# Patient Record
Sex: FEMALE | Race: WHITE | Employment: OTHER | URBAN - METROPOLITAN AREA
[De-identification: names, ages, dates, MRNs, and addresses within clinical notes are randomized per-mention and may not be internally consistent; named-entity substitution may affect disease eponyms.]

---

## 2022-06-27 DIAGNOSIS — M17.11 PRIMARY OSTEOARTHRITIS OF RIGHT KNEE: Primary | ICD-10-CM

## 2022-07-05 ENCOUNTER — TELEPHONE (OUTPATIENT)
Dept: ORTHOPEDIC SURGERY | Age: 77
End: 2022-07-05

## 2022-07-05 NOTE — TELEPHONE ENCOUNTER
Called patient and let her know the date and time for her joint camp and let her know I will schedule other appointments as soon as I hear back from bipin.

## 2022-07-08 RX ORDER — ACETAMINOPHEN 500 MG
1000 TABLET ORAL ONCE
Status: CANCELLED | OUTPATIENT
Start: 2022-07-08 | End: 2022-07-08

## 2022-07-08 RX ORDER — SODIUM CHLORIDE 9 MG/ML
INJECTION, SOLUTION INTRAVENOUS PRN
Status: CANCELLED | OUTPATIENT
Start: 2022-07-08

## 2022-07-08 RX ORDER — SODIUM CHLORIDE 0.9 % (FLUSH) 0.9 %
5-40 SYRINGE (ML) INJECTION PRN
Status: CANCELLED | OUTPATIENT
Start: 2022-07-08

## 2022-07-08 RX ORDER — SODIUM CHLORIDE 0.9 % (FLUSH) 0.9 %
5-40 SYRINGE (ML) INJECTION EVERY 12 HOURS SCHEDULED
Status: CANCELLED | OUTPATIENT
Start: 2022-07-08

## 2022-07-11 ENCOUNTER — TELEPHONE (OUTPATIENT)
Dept: ORTHOPEDIC SURGERY | Age: 77
End: 2022-07-11

## 2022-07-11 DIAGNOSIS — M17.11 PRIMARY OSTEOARTHRITIS OF RIGHT KNEE: ICD-10-CM

## 2022-07-12 ENCOUNTER — HOSPITAL ENCOUNTER (OUTPATIENT)
Dept: SURGERY | Age: 77
Discharge: HOME | End: 2022-07-15
Payer: MEDICARE

## 2022-07-12 ENCOUNTER — OFFICE VISIT (OUTPATIENT)
Dept: ORTHOPEDIC SURGERY | Age: 77
End: 2022-07-12

## 2022-07-12 ENCOUNTER — HOSPITAL ENCOUNTER (OUTPATIENT)
Dept: REHABILITATION | Age: 77
Discharge: HOME | End: 2022-07-15
Payer: MEDICARE

## 2022-07-12 DIAGNOSIS — M17.11 ARTHRITIS OF KNEE, RIGHT: Primary | ICD-10-CM

## 2022-07-12 DIAGNOSIS — M17.11 PRIMARY OSTEOARTHRITIS OF RIGHT KNEE: Primary | ICD-10-CM

## 2022-07-12 LAB
ANION GAP SERPL CALC-SCNC: 5 MMOL/L (ref 7–16)
APTT PPP: 26 SEC (ref 24.1–35.1)
BACTERIA SPEC CULT: ABNORMAL
BUN SERPL-MCNC: 10 MG/DL (ref 8–23)
CALCIUM SERPL-MCNC: 9.5 MG/DL (ref 8.3–10.4)
CHLORIDE SERPL-SCNC: 101 MMOL/L (ref 98–107)
CO2 SERPL-SCNC: 31 MMOL/L (ref 21–32)
CREAT SERPL-MCNC: 0.5 MG/DL (ref 0.6–1)
EKG ATRIAL RATE: 74 BPM
EKG DIAGNOSIS: NORMAL
EKG P AXIS: 36 DEGREES
EKG P-R INTERVAL: 128 MS
EKG Q-T INTERVAL: 394 MS
EKG QRS DURATION: 62 MS
EKG QTC CALCULATION (BAZETT): 437 MS
EKG R AXIS: 50 DEGREES
EKG T AXIS: 25 DEGREES
EKG VENTRICULAR RATE: 74 BPM
ERYTHROCYTE [DISTWIDTH] IN BLOOD BY AUTOMATED COUNT: 13 % (ref 11.9–14.6)
EST. AVERAGE GLUCOSE BLD GHB EST-MCNC: 111 MG/DL
GLUCOSE SERPL-MCNC: 84 MG/DL (ref 65–100)
HBA1C MFR BLD: 5.5 % (ref 4.8–5.6)
HCT VFR BLD AUTO: 37.1 % (ref 35.8–46.3)
HGB BLD-MCNC: 12.2 G/DL (ref 11.7–15.4)
INR PPP: 1
MCH RBC QN AUTO: 28.6 PG (ref 26.1–32.9)
MCHC RBC AUTO-ENTMCNC: 32.9 G/DL (ref 31.4–35)
MCV RBC AUTO: 87.1 FL (ref 79.6–97.8)
NRBC # BLD: 0 K/UL (ref 0–0.2)
PLATELET # BLD AUTO: 298 K/UL (ref 150–450)
PMV BLD AUTO: 9.9 FL (ref 9.4–12.3)
POTASSIUM SERPL-SCNC: 4 MMOL/L (ref 3.5–5.1)
PROTHROMBIN TIME: 13.8 SEC (ref 12.6–14.5)
RBC # BLD AUTO: 4.26 M/UL (ref 4.05–5.2)
SERVICE CMNT-IMP: ABNORMAL
SODIUM SERPL-SCNC: 137 MMOL/L (ref 136–145)
WBC # BLD AUTO: 4.6 K/UL (ref 4.3–11.1)

## 2022-07-12 PROCEDURE — 85730 THROMBOPLASTIN TIME PARTIAL: CPT

## 2022-07-12 PROCEDURE — 94760 N-INVAS EAR/PLS OXIMETRY 1: CPT

## 2022-07-12 PROCEDURE — 98960 EDU&TRN PT SELF-MGMT NQHP 1: CPT

## 2022-07-12 PROCEDURE — 80048 BASIC METABOLIC PNL TOTAL CA: CPT

## 2022-07-12 PROCEDURE — 85027 COMPLETE CBC AUTOMATED: CPT

## 2022-07-12 PROCEDURE — 83036 HEMOGLOBIN GLYCOSYLATED A1C: CPT

## 2022-07-12 PROCEDURE — 85610 PROTHROMBIN TIME: CPT

## 2022-07-12 PROCEDURE — 93005 ELECTROCARDIOGRAM TRACING: CPT | Performed by: ORTHOPAEDIC SURGERY

## 2022-07-12 PROCEDURE — 97161 PT EVAL LOW COMPLEX 20 MIN: CPT

## 2022-07-12 PROCEDURE — 87641 MR-STAPH DNA AMP PROBE: CPT

## 2022-07-12 RX ORDER — CALCIUM CARBONATE 500(1250)
500 TABLET ORAL DAILY
COMMUNITY

## 2022-07-12 RX ORDER — LISINOPRIL 2.5 MG/1
10 TABLET ORAL DAILY
COMMUNITY
End: 2022-07-15

## 2022-07-12 RX ORDER — LEVOTHYROXINE SODIUM 0.07 MG/1
75 TABLET ORAL DAILY
COMMUNITY

## 2022-07-12 RX ORDER — FERROUS SULFATE 137(45) MG
142 TABLET, EXTENDED RELEASE ORAL DAILY
COMMUNITY

## 2022-07-12 RX ORDER — OMEGA-3S/DHA/EPA/FISH OIL/D3 300MG-1000
400 CAPSULE ORAL DAILY
COMMUNITY

## 2022-07-12 ASSESSMENT — PAIN DESCRIPTION - DESCRIPTORS: DESCRIPTORS: ACHING

## 2022-07-12 ASSESSMENT — PAIN DESCRIPTION - LOCATION: LOCATION: KNEE

## 2022-07-12 ASSESSMENT — PAIN SCALES - GENERAL: PAINLEVEL_OUTOF10: 7

## 2022-07-12 ASSESSMENT — KOOS JR
HOW SEVERE IS YOUR KNEE STIFFNESS AFTER FIRST WAKING IN MORNING: 1
KOOS JR TOTAL INTERVAL SCORE: 84.6
TWISING OR PIVOTING ON KNEE: 0
BENDING TO THE FLOOR TO PICK UP OBJECT: 0
STANDING UPRIGHT: 0
RISING FROM SITTING: 0
GOING UP OR DOWN STAIRS: 1
STRAIGHTENING KNEE FULLY: 0

## 2022-07-12 ASSESSMENT — PAIN DESCRIPTION - ORIENTATION: ORIENTATION: RIGHT

## 2022-07-12 NOTE — PERIOP NOTE
Patient verified name and . Order for consent found in EHR and matches case posting; patient verified. Type 3 surgery, PAT Joint assessment complete. Labs per surgeon: CBC,BMP, PT/PTT, HgbA1C; results pending. Labs per anesthesia protocol: no additional lab work needed. EKG: Done today- within anesthesia guidelines. MRSA/MSSA swab collected; pharmacy to review and dose antibiotic as appropriate. Hospital approved surgical skin cleanser and instructions to return bottle on DOS given per hospital policy. Patient provided with handouts including Guide to Surgery, Pain Management, Hand Hygiene, Blood Transfusion Education, and [facility] Brochure. Patient answered medical/surgical history questions at their best of ability. All prior to admission medications documented in [facility]. Original medication prescription bottle not visualized during patient appointment. Patient instructed to hold all vitamins 3 weeks prior to surgery and NSAIDS 5 days prior to surgery. Patient teach back successful and patient demonstrates knowledge of instruction.

## 2022-07-12 NOTE — H&P (VIEW-ONLY)
[id number] [facility]  Pre Operative History and Physical Exam    Patient ID:  Anatoliy Squires  [id number]  48 y.o.  1945    Today: July 12, 2022           CC:  Right knee pain    HPI:   The patient has end stage arthritis of the right knee. The patient was evaluated and examined during a consultation prior to this office visit. There have been no changes to the patient's orthopedic condition since the initial consultation. The patient has failed previous conservative treatment for this condition including antiinflammatories , and lifestyle modifications. The necessity for joint replacement is present. The patient will be admitted the day of surgery for right knee. Past Medical/Surgical History:  Past Medical History:   Diagnosis Date    Arthritis     Hypertension     Managed with meds     Kidney stone      Past Surgical History:   Procedure Laterality Date    ESOPHAGOGASTRODUODENOSCOPY      JOINT REPLACEMENT Left 06/05/2012        Allergies:    Allergies   Allergen Reactions    Ciprofloxacin Other (See Comments)        Physical Exam:   General: NAD, Alert, Oriented, Appears their stated age     [de-identified]: NC/AT    Skin: No rashes, lesions or wounds seen      Psych: normal affect      Heart: Regular Rate, Rhythm     Lungs: unlabored respirations, no wheezing    Abdomen: Soft and non-distended     Ortho: Pain with limited ROM of the right knee    Neuro: no focal defects, moving extremities equally    Lymph: no lymphadenopathy     Meds:   Current Outpatient Medications   Medication Sig    lisinopril (PRINIVIL;ZESTRIL) 2.5 MG tablet Take 2.5 mg by mouth daily    levothyroxine (SYNTHROID) 75 MCG tablet Take 75 mcg by mouth Daily    ferrous sulfate (SLOW FE) 142 (45 Fe) MG extended release tablet Take 142 mg by mouth daily    calcium carbonate (OSCAL) 500 MG TABS tablet Take 500 mg by mouth daily    vitamin D3 (CHOLECALCIFEROL) 10 MCG (400 UNIT) TABS tablet Take 400 Units by mouth daily    Cholecalciferol (VITAMIN D3) 1.25 MG (25563 UT) TABS Take by mouth once a week    cyclobenzaprine (FLEXERIL) 10 MG tablet Take 10 mg by mouth 2 times daily as needed    naproxen (NAPROSYN) 500 MG tablet Take 500 mg by mouth 2 times daily as needed      No current facility-administered medications for this visit. Labs:  Hospital Outpatient Visit on 07/12/2022   Component Date Value Ref Range Status    WBC 07/12/2022 4.6  4.3 - 11.1 K/uL Final    RBC 07/12/2022 4.26  4.05 - 5.2 M/uL Final    Hemoglobin 07/12/2022 12.2  11.7 - 15.4 g/dL Final    Hematocrit 07/12/2022 37.1  35.8 - 46.3 % Final    MCV 07/12/2022 87.1  79.6 - 97.8 FL Final    MCH 07/12/2022 28.6  26.1 - 32.9 PG Final    MCHC 07/12/2022 32.9  31.4 - 35.0 g/dL Final    RDW 07/12/2022 13.0  11.9 - 14.6 % Final    Platelets 32/48/6511 298  150 - 450 K/uL Final    MPV 07/12/2022 9.9  9.4 - 12.3 FL Final    nRBC 07/12/2022 0.00  0.0 - 0.2 K/uL Final    **Note: Absolute NRBC parameter is now reported with Hemogram**    Sodium 07/12/2022 137  136 - 145 mmol/L Final    Potassium 07/12/2022 4.0  3.5 - 5.1 mmol/L Final    Chloride 07/12/2022 101  98 - 107 mmol/L Final    CO2 07/12/2022 31  21 - 32 mmol/L Final    Anion Gap 07/12/2022 5* 7 - 16 mmol/L Final    Glucose 07/12/2022 84  65 - 100 mg/dL Final    BUN 07/12/2022 10  8 - 23 MG/DL Final    CREATININE 07/12/2022 0.50* 0.6 - 1.0 MG/DL Final    GFR  07/12/2022 >60  >60 ml/min/1.73m2 Final    GFR Non- 07/12/2022 >60  >60 ml/min/1.73m2 Final    Comment:      Estimated GFR is calculated using the Modification of Diet in Renal Disease (MDRD) Study equation, reported for both  Americans (GFRAA) and non- Americans (GFRNA), and normalized to 1.73m2 body surface area. The physician must decide which value applies to the patient. The MDRD study equation should only be used in individuals age 25 or older.  It has not been validated for the following: pregnant women, patients with serious comorbid conditions,or on certain medications, or persons with extremes of body size, muscle mass, or nutritional status.  Calcium 07/12/2022 9.5  8.3 - 10.4 MG/DL Final    Protime 07/12/2022 13.8  12.6 - 14.5 sec Final    INR 07/12/2022 1.0    Final    Comment: Suggested therapeutic INR range:  Venous thrombosis and embolus  2.0-3.0  Prosthetic heart valve         2.5-3.5  ** Note new reference range and method **      PTT 07/12/2022 26.0  24.1 - 35.1 SEC Final    Comment: Heparin Therapeutic Range = 74 - 123 seconds  In addition to factor deficiency, monitoring heparin therapy, etc., evaluation of a prolonged aPTT result should include consideration of preanalytic variables such as heparin flush contamination, specimen integrity issues, etc.      Ventricular Rate 07/12/2022 74  BPM Final    Atrial Rate 07/12/2022 74  BPM Final    P-R Interval 07/12/2022 128  ms Final    QRS Duration 07/12/2022 62  ms Final    Q-T Interval 07/12/2022 394  ms Final    QTc Calculation (Bazett) 07/12/2022 437  ms Final    P Axis 07/12/2022 36  degrees Final    R Axis 07/12/2022 50  degrees Final    T Axis 07/12/2022 25  degrees Final    Diagnosis 07/12/2022 Normal sinus rhythm   Final                 Patient Active Problem List   Diagnosis    Arthritis of knee, right         Assessment:   1. Arthritis of the right knee      Plan:    1. Proceed with scheduled right knee    The patient was counseled at length about the risks of jerome Covid-19 during their perioperative period and any recovery window from their procedure. The patient was made aware that jerome Covid-19  may worsen their prognosis for recovering from their procedure and lend to a higher morbidity and/or mortality risk. All material risks, benefits, and reasonable alternatives including postponing the procedure were discussed. The patient does wish to proceed with the procedure at this time. Signed By: ART Alston  July 12, 2022

## 2022-07-12 NOTE — PROGRESS NOTES
[id number] [facility]  Pre Operative History and Physical Exam    Patient ID:  Beatriz Arita  [id number]  50 y.o.  1945    Today: July 12, 2022           CC:  Right knee pain    HPI:   The patient has end stage arthritis of the right knee. The patient was evaluated and examined during a consultation prior to this office visit. There have been no changes to the patient's orthopedic condition since the initial consultation. The patient has failed previous conservative treatment for this condition including antiinflammatories , and lifestyle modifications. The necessity for joint replacement is present. The patient will be admitted the day of surgery for right knee. Past Medical/Surgical History:  Past Medical History:   Diagnosis Date    Arthritis     Hypertension     Managed with meds     Kidney stone      Past Surgical History:   Procedure Laterality Date    ESOPHAGOGASTRODUODENOSCOPY      JOINT REPLACEMENT Left 06/05/2012        Allergies:    Allergies   Allergen Reactions    Ciprofloxacin Other (See Comments)        Physical Exam:   General: NAD, Alert, Oriented, Appears their stated age     [de-identified]: NC/AT    Skin: No rashes, lesions or wounds seen      Psych: normal affect      Heart: Regular Rate, Rhythm     Lungs: unlabored respirations, no wheezing    Abdomen: Soft and non-distended     Ortho: Pain with limited ROM of the right knee    Neuro: no focal defects, moving extremities equally    Lymph: no lymphadenopathy     Meds:   Current Outpatient Medications   Medication Sig    lisinopril (PRINIVIL;ZESTRIL) 2.5 MG tablet Take 2.5 mg by mouth daily    levothyroxine (SYNTHROID) 75 MCG tablet Take 75 mcg by mouth Daily    ferrous sulfate (SLOW FE) 142 (45 Fe) MG extended release tablet Take 142 mg by mouth daily    calcium carbonate (OSCAL) 500 MG TABS tablet Take 500 mg by mouth daily    vitamin D3 (CHOLECALCIFEROL) 10 MCG (400 UNIT) TABS tablet Take 400 Units by mouth daily    Cholecalciferol (VITAMIN D3) 1.25 MG (79066 UT) TABS Take by mouth once a week    cyclobenzaprine (FLEXERIL) 10 MG tablet Take 10 mg by mouth 2 times daily as needed    naproxen (NAPROSYN) 500 MG tablet Take 500 mg by mouth 2 times daily as needed      No current facility-administered medications for this visit. Labs:  Hospital Outpatient Visit on 07/12/2022   Component Date Value Ref Range Status    WBC 07/12/2022 4.6  4.3 - 11.1 K/uL Final    RBC 07/12/2022 4.26  4.05 - 5.2 M/uL Final    Hemoglobin 07/12/2022 12.2  11.7 - 15.4 g/dL Final    Hematocrit 07/12/2022 37.1  35.8 - 46.3 % Final    MCV 07/12/2022 87.1  79.6 - 97.8 FL Final    MCH 07/12/2022 28.6  26.1 - 32.9 PG Final    MCHC 07/12/2022 32.9  31.4 - 35.0 g/dL Final    RDW 07/12/2022 13.0  11.9 - 14.6 % Final    Platelets 06/97/8469 298  150 - 450 K/uL Final    MPV 07/12/2022 9.9  9.4 - 12.3 FL Final    nRBC 07/12/2022 0.00  0.0 - 0.2 K/uL Final    **Note: Absolute NRBC parameter is now reported with Hemogram**    Sodium 07/12/2022 137  136 - 145 mmol/L Final    Potassium 07/12/2022 4.0  3.5 - 5.1 mmol/L Final    Chloride 07/12/2022 101  98 - 107 mmol/L Final    CO2 07/12/2022 31  21 - 32 mmol/L Final    Anion Gap 07/12/2022 5* 7 - 16 mmol/L Final    Glucose 07/12/2022 84  65 - 100 mg/dL Final    BUN 07/12/2022 10  8 - 23 MG/DL Final    CREATININE 07/12/2022 0.50* 0.6 - 1.0 MG/DL Final    GFR  07/12/2022 >60  >60 ml/min/1.73m2 Final    GFR Non- 07/12/2022 >60  >60 ml/min/1.73m2 Final    Comment:      Estimated GFR is calculated using the Modification of Diet in Renal Disease (MDRD) Study equation, reported for both  Americans (GFRAA) and non- Americans (GFRNA), and normalized to 1.73m2 body surface area. The physician must decide which value applies to the patient. The MDRD study equation should only be used in individuals age 25 or older.  It has not been validated for the following: pregnant women, patients with serious comorbid conditions,or on certain medications, or persons with extremes of body size, muscle mass, or nutritional status.  Calcium 07/12/2022 9.5  8.3 - 10.4 MG/DL Final    Protime 07/12/2022 13.8  12.6 - 14.5 sec Final    INR 07/12/2022 1.0    Final    Comment: Suggested therapeutic INR range:  Venous thrombosis and embolus  2.0-3.0  Prosthetic heart valve         2.5-3.5  ** Note new reference range and method **      PTT 07/12/2022 26.0  24.1 - 35.1 SEC Final    Comment: Heparin Therapeutic Range = 74 - 123 seconds  In addition to factor deficiency, monitoring heparin therapy, etc., evaluation of a prolonged aPTT result should include consideration of preanalytic variables such as heparin flush contamination, specimen integrity issues, etc.      Ventricular Rate 07/12/2022 74  BPM Final    Atrial Rate 07/12/2022 74  BPM Final    P-R Interval 07/12/2022 128  ms Final    QRS Duration 07/12/2022 62  ms Final    Q-T Interval 07/12/2022 394  ms Final    QTc Calculation (Bazett) 07/12/2022 437  ms Final    P Axis 07/12/2022 36  degrees Final    R Axis 07/12/2022 50  degrees Final    T Axis 07/12/2022 25  degrees Final    Diagnosis 07/12/2022 Normal sinus rhythm   Final                 Patient Active Problem List   Diagnosis    Arthritis of knee, right         Assessment:   1. Arthritis of the right knee      Plan:    1. Proceed with scheduled right knee    The patient was counseled at length about the risks of jerome Covid-19 during their perioperative period and any recovery window from their procedure. The patient was made aware that jerome Covid-19  may worsen their prognosis for recovering from their procedure and lend to a higher morbidity and/or mortality risk. All material risks, benefits, and reasonable alternatives including postponing the procedure were discussed. The patient does wish to proceed with the procedure at this time. Signed By: ART Mota  July 12, 2022

## 2022-07-12 NOTE — PERIOP NOTE
Lab results within anesthesia guidelines. HgbA1C remains pending; will have charge nurse follow up. Left voicemail with pt to return call and give correct lisinopril dosage.

## 2022-07-12 NOTE — PROGRESS NOTES
Dnona Carolyn  : 1945  Primary: [facility]  Secondary:  Joint Camp at [facility]  [address] , [place]  [phone]      Physical Therapy Prehab Evaluation Summary:2022   Time In/Out   PT Charge Capture  Episode     MEDICAL/REFERRING DIAGNOSIS: Unilateral primary osteoarthritis, right knee [M17.11]  REFERRING PHYSICIAN: Johnny Raya, *    ICD-10: Treatment Diagnosis:   · Pain in Right Knee (M25.561)  · Stiffness of Right Knee, Not elsewhere classified (M25.661)    DATE OF SURGERY: 22  Assessment:   COMMENTS:  She is here alone and is having a R TKA. She had a L TKA. She is going home with her spouse nad daughter';s assistance. She will need a RW prior to DC. PROBLEM LIST:   (Impacting functional limitations):  Ms. Ever Mcghee presents with the following lower extremity(s) problems:  1. Strength  2. Range of Motion  3. Home Exercise Program  4. Pain INTERVENTIONS PLANNED:   (Benefits and precautions of physical therapy have been discussed with the patient.)  1. Home Exercise Program  2. Educational Discussion       GOALS: (Goals have been discussed and agreed upon with patient.)  Discharge Goals: Time Frame: 1 Day  1. Patient will demonstrate independence with a home exercise program designed to increase strength, range of motion and pain control to minimize functional deficits and optimize patient for total joint replacement. Subjective:   Past Medical History/Comorbidities:   Ms. Ever Mcghee  has a past medical history of Arthritis, Hypertension, and Kidney stone. Ms. Ever Mcghee  has a past surgical history that includes Esophagogastroduodenoscopy and joint replacement (Left, 2012).     Allergies:  Ciprofloxacin    Current Medications:  Refer to pre-assessment nursing note    Home Set-Up/Prior Level of Function:  Lives With: Spouse,Family  Type of Home: House  Home Layout: One level  Home Access: Stairs to enter without rails  Entrance Stairs - Number of Steps: 2  Bathroom Shower/Tub: Walk-in shower  Bathroom Toilet: Standard  ADL Assistance: Independent  Homemaking Assistance: Independent  Ambulation Assistance: Independent  Transfer Assistance: Independent  Occupation: Retired    Dominant Side:  Dominant Hand: : Right      Current Functional Status:   Ambulation:  [x] Independent  [] Walk Indoors Only  [] Walk Outdoors  [] Use Assistive Device  [] Use Wheelchair Only Dressing:  [x] [address] from Someone for:  [] Sock/Shoes  [] Pants  [] Everything   Bathing/Showering:   [x] Independent  [] Requires Assistance from Someone  [] [address] Only Household Activities:  [x] Routine house and yard work  [] Light Housework Only  [] None     Objective:   PAIN:   Pre-Treatment Pain  Pain Assessment: 0-10  Pain Level: 7 (at its worst)  Pain Location: Knee  Pain Orientation: Right  Pain Descriptors: Aching    Post Treatment: 2    Outcome Measure:   HOOS-JR:       KOOS-JR:  KOJr MAXI. Knee Survey Score: 2    LOWER EXTREMITY GROSS EVALUATION:  RIGHT LE   Within Functional Limits   Abnormal   Comments   Strength [] [] Strength RLE  Strength RLE: Exception  R Hip Flexion: 3+/5  R Knee Extension: 3+/5  R Ankle Dorsiflexion: 3+/5   Range of Motion [] [] AROM RLE (degrees)  RLE AROM: Exceptions  R Knee Flexion 0-145: 109  R Knee Extension 0: 10      LEFT LE Within Functional Limits   Abnormal   Comments   Strength [] [] 3+   Range of Motion [] [] Decreased L knee flexion     UPPER EXTREMITY GROSS EVALUATION:     Within Functional Limits   Abnormal   Comments   Strength [x] []    Range of Motion [x] []      SENSATION  Sensation [x]       COGNITION/  PERCEPTION: Intact Impaired (Comments):   Orientation [x]     Vision [x]     Hearing [x]     Cognition  [x]       TRANSFERS: I Mod I S SBA CGA Min Mod Max Total  NT x2 Comments:   Sit to Stand [x] [] [] [] [] [] [] [] [] [] []    Stand to Sit [x] [] [] [] [] [] [] [] [] [] []    Stand pivot [x] functional mobility and home environment. , educate in PT HEP in preparation for surgery, educate in hospital plan of care. COMPLIANCE WITH PROGRAM/EXERCISE: compliant most of the time. TOTAL TREATMENT DURATION:  Time In: 1100  Time Out: 1115  Minutes: 15    Regarding Vanessa Gallego therapy, I certify that the treatment plan above will be carried out by a therapist or under their direction.   Thank you for this referral,  Marino Boykin, PT

## 2022-07-12 NOTE — PERIOP NOTE
PLEASE CONTINUE TAKING ALL PRESCRIPTION MEDICATIONS UP TO THE DAY OF SURGERY UNLESS OTHERWISE DIRECTED BELOW. DISCONTINUE all vitamins and supplements 21 days prior to surgery. DISCONTINUE Non-Steriodal Anti-Inflammatory (NSAIDS) such as Advil and Aleve 5 days prior to surgery. Home Medications to take  the day of surgery   Levothyroxine             Home Medications   to Hold   Stop vitamins and supplements now    Stop naproxen five days prior to surgery      Comments    Bring Incentive spirometer and Elizabeth-hex wash to the hospital on the day of surgery. On the day before surgery please take Acetaminophen 1000mg in the morning and then again before bed. You may substitute for Tylenol 650 mg. Please do not bring home medications with you on the day of surgery unless otherwise directed by your nurse. If you are instructed to bring home medications, please give them to your nurse as they will be administered by the nursing staff. If you have any questions, please call Chad Phelps [phone]. A copy of this note was provided to the patient for reference.

## 2022-07-12 NOTE — CARE COORDINATION
Joint Camp Case Management note:  Patient screened in Prehab for discharge planning needs. Patient scheduled for a future total joint replacement. We discussed Home Health and equipment needed after surgery. List of Home Health providers offered. Patient w/o preference towards provider. We will arrange [facility] on the day of surgery. Will need a walker and will let us know about 3-1 BSC. She plans to measure her bathroom to see if it will fit.

## 2022-07-13 VITALS
HEART RATE: 71 BPM | BODY MASS INDEX: 30.76 KG/M2 | DIASTOLIC BLOOD PRESSURE: 72 MMHG | OXYGEN SATURATION: 96 % | TEMPERATURE: 97.3 F | SYSTOLIC BLOOD PRESSURE: 142 MMHG | HEIGHT: 63 IN | WEIGHT: 173.6 LBS | RESPIRATION RATE: 16 BRPM

## 2022-07-13 NOTE — PROGRESS NOTES
tablet Take 10 mg by mouth 2 times daily as needed 7/16/21   Ar Automatic Reconciliation   naproxen (NAPROSYN) 500 MG tablet Take 500 mg by mouth 2 times daily as needed  7/16/21   Ar Automatic Reconciliation     Allergies   Allergen Reactions    Ciprofloxacin Other (See Comments)          Objective:     Physical Exam:   No data found. ECG:    No results found for this or any previous visit (from the past 4464 hour(s)). Data Review:   Labs:   Labs reviewed    Problem List:  )  Patient Active Problem List   Diagnosis    Arthritis of knee, right       Total Joint Surgery Pre-Assessment Recommendations:           Continuous saturation monitoring during hospitalization. O2 prn per respiratory protocol.      Signed By: IRENA Chen - CNP-C    July 13, 2022

## 2022-07-13 NOTE — PROGRESS NOTES
22 1131   Oxygen Therapy/Pulse Ox   O2 Therapy Room air   Heart Rate 71   SpO2 96 %   Pulse Oximeter Device Mode Intermittent   $Pulse Oximeter $Spot check (single)   RT Misc Charges   $RT Education $Self Management     Initial respiratory Assessment completed with pt. Pt was interviewed and evaluated in Joint camp prior to surgery. Patient ID:  Sveta Madrid  [id number]  70 y.o.  1945  Surgeon: Dr. Yamilet Scott  Date of Surgery: [facility]  Procedure: Total Right Knee Arthroplasty  Primary Care Physician: Asia Flores [phone]  Specialists:       SAT   96 %  HR 71    FEV1:1.87  % PREDICTED:   102%    BS:CLEAR      Pt taught proper COUGH technique  IS REVIEWED WITH PT AS WELL AS BENEFITS OF USING IS IN SEDENTARY PTS. DIAPHRAGMATIC BREATHING EXERCISE INSTRUCTIONS GIVEN    History of smokin PPD FOR 30 YEARS                 Quit date:       25 YEARS AGO  Secondhand smoke:ALL HER IMMEDIATE FAMILY GROWING UP    Past procedures with Oxygen desaturation or delayed awakening:DENIES    Past Medical History:   Diagnosis Date    Arthritis     Hypertension     Managed with meds     Kidney stone       COVID 2020  Respiratory history:DENIES SOB                                                                  Respiratory meds:  DENIES    FAMILY PRESENT:             NO     PAST SLEEP STUDY:                          DENIES  HX OF ARAMIS:                                           DENIES  ARAMIS assessment:     DANGERS OF UNTREATED ARAMIS EXPLAINED TO PT.                                          SLEEPS ON SIDE        PT HAS SWALLOWING DIFFICULTIES  PHYSICAL EXAM   Body mass index is 30.75 kg/m².    Vitals:    22 1131   BP:    Pulse: (P) 71   Resp:    Temp:    SpO2: (P) 96%     Neck circumference:  36    cm    Loud snoring:                                                 YES             Witnessed apnea or wakening gasping or choking:        DENIES         Awakens with headaches: DENIES  Morning or daytime tiredness/ sleepiness:                             TIRED  Dry mouth or sore throat in morning:            SOME                                   Becker stage:  4                                   SACS score:6  Stop Bang 5                               CS HS  RESPIRATORY ASSESSMENT Q SHIFT   O2 PRN    ALBUTEROL  NEBULIZER Q6 PRN WHEEZING                                                Referrals:  DECLINED  Pt.  Phone Number:

## 2022-07-13 NOTE — PERIOP NOTE
Hemoglobin A1C  Order: [id number]   Status: Final result     Visible to patient: No (not released)     Next appt: 08/16/2022 at 02:00 PM in Orthopedic Surgery (Melhcor Cross MD)     0 Result Notes     Ref Range & Units 7/12/22 1113   Hemoglobin A1C 4.8 - 5.6 % 5.5    eAG mg/dL 111    Comment: Reference Range   Normal: 4.8-5.6   Diabetic >=6.5%   Normal       <5.7%    Resulting [place]              Specimen Collected: 07/12/22 11:13 Last Resulted: 07/12/22 15:13

## 2022-07-15 RX ORDER — LISINOPRIL AND HYDROCHLOROTHIAZIDE 12.5; 1 MG/1; MG/1
1 TABLET ORAL DAILY
COMMUNITY

## 2022-07-15 RX ORDER — ATORVASTATIN CALCIUM 10 MG/1
10 TABLET, FILM COATED ORAL
COMMUNITY

## 2022-07-21 ENCOUNTER — ANESTHESIA EVENT (OUTPATIENT)
Dept: SURGERY | Age: 77
End: 2022-07-21
Payer: MEDICARE

## 2022-07-22 ENCOUNTER — HOSPITAL ENCOUNTER (OUTPATIENT)
Age: 77
Discharge: HOME HEALTH | End: 2022-07-23
Attending: ORTHOPAEDIC SURGERY | Admitting: ORTHOPAEDIC SURGERY
Payer: MEDICARE

## 2022-07-22 ENCOUNTER — ANESTHESIA (OUTPATIENT)
Dept: SURGERY | Age: 77
End: 2022-07-22
Payer: MEDICARE

## 2022-07-22 ENCOUNTER — HOME HEALTH ADMISSION (OUTPATIENT)
Dept: HOME HEALTH SERVICES | Facility: HOME HEALTH | Age: 77
End: 2022-07-22
Payer: MEDICARE

## 2022-07-22 DIAGNOSIS — M17.11 UNILATERAL PRIMARY OSTEOARTHRITIS, RIGHT KNEE: Primary | ICD-10-CM

## 2022-07-22 DIAGNOSIS — M17.11 ARTHRITIS OF KNEE, RIGHT: ICD-10-CM

## 2022-07-22 LAB
HCT VFR BLD AUTO: 32.3 % (ref 35.8–46.3)
HGB BLD-MCNC: 10.6 G/DL (ref 11.7–15.4)

## 2022-07-22 PROCEDURE — 2500000003 HC RX 250 WO HCPCS: Performed by: PHYSICIAN ASSISTANT

## 2022-07-22 PROCEDURE — 6360000002 HC RX W HCPCS: Performed by: ANESTHESIOLOGY

## 2022-07-22 PROCEDURE — 7100000001 HC PACU RECOVERY - ADDTL 15 MIN: Performed by: ORTHOPAEDIC SURGERY

## 2022-07-22 PROCEDURE — 20985 CPTR-ASST DIR MS PX: CPT | Performed by: ORTHOPAEDIC SURGERY

## 2022-07-22 PROCEDURE — 6360000002 HC RX W HCPCS: Performed by: NURSE ANESTHETIST, CERTIFIED REGISTERED

## 2022-07-22 PROCEDURE — 27447 TOTAL KNEE ARTHROPLASTY: CPT | Performed by: PHYSICIAN ASSISTANT

## 2022-07-22 PROCEDURE — 36415 COLL VENOUS BLD VENIPUNCTURE: CPT

## 2022-07-22 PROCEDURE — 2500000003 HC RX 250 WO HCPCS: Performed by: ORTHOPAEDIC SURGERY

## 2022-07-22 PROCEDURE — 2580000003 HC RX 258: Performed by: ANESTHESIOLOGY

## 2022-07-22 PROCEDURE — 2500000003 HC RX 250 WO HCPCS: Performed by: NURSE ANESTHETIST, CERTIFIED REGISTERED

## 2022-07-22 PROCEDURE — 3600000015 HC SURGERY LEVEL 5 ADDTL 15MIN: Performed by: ORTHOPAEDIC SURGERY

## 2022-07-22 PROCEDURE — 97161 PT EVAL LOW COMPLEX 20 MIN: CPT

## 2022-07-22 PROCEDURE — 94760 N-INVAS EAR/PLS OXIMETRY 1: CPT

## 2022-07-22 PROCEDURE — 85018 HEMOGLOBIN: CPT

## 2022-07-22 PROCEDURE — 3700000001 HC ADD 15 MINUTES (ANESTHESIA): Performed by: ORTHOPAEDIC SURGERY

## 2022-07-22 PROCEDURE — 6370000000 HC RX 637 (ALT 250 FOR IP): Performed by: PHYSICIAN ASSISTANT

## 2022-07-22 PROCEDURE — 64447 NJX AA&/STRD FEMORAL NRV IMG: CPT | Performed by: ANESTHESIOLOGY

## 2022-07-22 PROCEDURE — 97535 SELF CARE MNGMENT TRAINING: CPT

## 2022-07-22 PROCEDURE — C1713 ANCHOR/SCREW BN/BN,TIS/BN: HCPCS | Performed by: ORTHOPAEDIC SURGERY

## 2022-07-22 PROCEDURE — 6370000000 HC RX 637 (ALT 250 FOR IP): Performed by: ANESTHESIOLOGY

## 2022-07-22 PROCEDURE — 3700000000 HC ANESTHESIA ATTENDED CARE: Performed by: ORTHOPAEDIC SURGERY

## 2022-07-22 PROCEDURE — 2709999900 HC NON-CHARGEABLE SUPPLY: Performed by: ORTHOPAEDIC SURGERY

## 2022-07-22 PROCEDURE — 6360000002 HC RX W HCPCS: Performed by: PHYSICIAN ASSISTANT

## 2022-07-22 PROCEDURE — 97530 THERAPEUTIC ACTIVITIES: CPT

## 2022-07-22 PROCEDURE — 6360000002 HC RX W HCPCS: Performed by: ORTHOPAEDIC SURGERY

## 2022-07-22 PROCEDURE — 94761 N-INVAS EAR/PLS OXIMETRY MLT: CPT

## 2022-07-22 PROCEDURE — C1776 JOINT DEVICE (IMPLANTABLE): HCPCS | Performed by: ORTHOPAEDIC SURGERY

## 2022-07-22 PROCEDURE — 7100000000 HC PACU RECOVERY - FIRST 15 MIN: Performed by: ORTHOPAEDIC SURGERY

## 2022-07-22 PROCEDURE — 3600000005 HC SURGERY LEVEL 5 BASE: Performed by: ORTHOPAEDIC SURGERY

## 2022-07-22 PROCEDURE — 27447 TOTAL KNEE ARTHROPLASTY: CPT | Performed by: ORTHOPAEDIC SURGERY

## 2022-07-22 PROCEDURE — 2720000010 HC SURG SUPPLY STERILE: Performed by: ORTHOPAEDIC SURGERY

## 2022-07-22 PROCEDURE — 2580000003 HC RX 258: Performed by: PHYSICIAN ASSISTANT

## 2022-07-22 PROCEDURE — 97165 OT EVAL LOW COMPLEX 30 MIN: CPT

## 2022-07-22 DEVICE — CEMENT BONE 40GM HI VISC PALACOS R: Type: IMPLANTABLE DEVICE | Site: KNEE | Status: FUNCTIONAL

## 2022-07-22 DEVICE — BASEPLATE TIB SZ 4 AP46MM ML70MM KNEE TRITANIUM 4 CRUCFRM: Type: IMPLANTABLE DEVICE | Site: KNEE | Status: FUNCTIONAL

## 2022-07-22 DEVICE — IMPLANTABLE DEVICE: Type: IMPLANTABLE DEVICE | Site: KNEE | Status: FUNCTIONAL

## 2022-07-22 DEVICE — COMPONENT PAT DIA32MM THK10MM SUP INFERIOR ASYM TRIATHLON: Type: IMPLANTABLE DEVICE | Site: KNEE | Status: FUNCTIONAL

## 2022-07-22 DEVICE — COMPONENT TOT KNEE CAPPED PRIMARY CEM X3 TRIATHLON: Type: IMPLANTABLE DEVICE | Status: FUNCTIONAL

## 2022-07-22 DEVICE — INSERT TIB CS 4 10 MM ARTC KNEE BEAR TECHNOLOGY TRIATHLON: Type: IMPLANTABLE DEVICE | Site: KNEE | Status: FUNCTIONAL

## 2022-07-22 RX ORDER — LIDOCAINE HYDROCHLORIDE 20 MG/ML
INJECTION, SOLUTION EPIDURAL; INFILTRATION; INTRACAUDAL; PERINEURAL PRN
Status: DISCONTINUED | OUTPATIENT
Start: 2022-07-22 | End: 2022-07-22 | Stop reason: SDUPTHER

## 2022-07-22 RX ORDER — DIPHENHYDRAMINE HYDROCHLORIDE 50 MG/ML
12.5 INJECTION INTRAMUSCULAR; INTRAVENOUS
Status: DISCONTINUED | OUTPATIENT
Start: 2022-07-22 | End: 2022-07-22 | Stop reason: HOSPADM

## 2022-07-22 RX ORDER — LEVOTHYROXINE SODIUM 0.07 MG/1
75 TABLET ORAL DAILY
Status: DISCONTINUED | OUTPATIENT
Start: 2022-07-23 | End: 2022-07-23 | Stop reason: HOSPADM

## 2022-07-22 RX ORDER — ACETAMINOPHEN 500 MG
1000 TABLET ORAL ONCE
Status: DISCONTINUED | OUTPATIENT
Start: 2022-07-22 | End: 2022-07-22 | Stop reason: SDUPTHER

## 2022-07-22 RX ORDER — TRANEXAMIC ACID 100 MG/ML
INJECTION, SOLUTION INTRAVENOUS PRN
Status: DISCONTINUED | OUTPATIENT
Start: 2022-07-22 | End: 2022-07-22 | Stop reason: SDUPTHER

## 2022-07-22 RX ORDER — HYDROMORPHONE HYDROCHLORIDE 1 MG/ML
0.25 INJECTION, SOLUTION INTRAMUSCULAR; INTRAVENOUS; SUBCUTANEOUS EVERY 5 MIN PRN
Status: DISCONTINUED | OUTPATIENT
Start: 2022-07-22 | End: 2022-07-22 | Stop reason: HOSPADM

## 2022-07-22 RX ORDER — EPHEDRINE SULFATE/0.9% NACL/PF 50 MG/5 ML
SYRINGE (ML) INTRAVENOUS PRN
Status: DISCONTINUED | OUTPATIENT
Start: 2022-07-22 | End: 2022-07-22 | Stop reason: SDUPTHER

## 2022-07-22 RX ORDER — ONDANSETRON 2 MG/ML
INJECTION INTRAMUSCULAR; INTRAVENOUS PRN
Status: DISCONTINUED | OUTPATIENT
Start: 2022-07-22 | End: 2022-07-22 | Stop reason: SDUPTHER

## 2022-07-22 RX ORDER — ROCURONIUM BROMIDE 10 MG/ML
INJECTION, SOLUTION INTRAVENOUS PRN
Status: DISCONTINUED | OUTPATIENT
Start: 2022-07-22 | End: 2022-07-22 | Stop reason: SDUPTHER

## 2022-07-22 RX ORDER — DIPHENHYDRAMINE HYDROCHLORIDE 50 MG/ML
25 INJECTION INTRAMUSCULAR; INTRAVENOUS EVERY 6 HOURS PRN
Status: DISCONTINUED | OUTPATIENT
Start: 2022-07-22 | End: 2022-07-23 | Stop reason: HOSPADM

## 2022-07-22 RX ORDER — HYDROMORPHONE HCL 110MG/55ML
PATIENT CONTROLLED ANALGESIA SYRINGE INTRAVENOUS PRN
Status: DISCONTINUED | OUTPATIENT
Start: 2022-07-22 | End: 2022-07-22 | Stop reason: SDUPTHER

## 2022-07-22 RX ORDER — ONDANSETRON 2 MG/ML
4 INJECTION INTRAMUSCULAR; INTRAVENOUS
Status: DISCONTINUED | OUTPATIENT
Start: 2022-07-22 | End: 2022-07-22 | Stop reason: HOSPADM

## 2022-07-22 RX ORDER — SODIUM CHLORIDE 0.9 % (FLUSH) 0.9 %
5-40 SYRINGE (ML) INJECTION EVERY 12 HOURS SCHEDULED
Status: DISCONTINUED | OUTPATIENT
Start: 2022-07-22 | End: 2022-07-22 | Stop reason: HOSPADM

## 2022-07-22 RX ORDER — ACETAMINOPHEN 500 MG
1000 TABLET ORAL ONCE
Status: COMPLETED | OUTPATIENT
Start: 2022-07-22 | End: 2022-07-22

## 2022-07-22 RX ORDER — SODIUM CHLORIDE 0.9 % (FLUSH) 0.9 %
5-40 SYRINGE (ML) INJECTION PRN
Status: DISCONTINUED | OUTPATIENT
Start: 2022-07-22 | End: 2022-07-22 | Stop reason: SDUPTHER

## 2022-07-22 RX ORDER — SODIUM CHLORIDE, SODIUM LACTATE, POTASSIUM CHLORIDE, CALCIUM CHLORIDE 600; 310; 30; 20 MG/100ML; MG/100ML; MG/100ML; MG/100ML
INJECTION, SOLUTION INTRAVENOUS CONTINUOUS
Status: DISCONTINUED | OUTPATIENT
Start: 2022-07-22 | End: 2022-07-22 | Stop reason: HOSPADM

## 2022-07-22 RX ORDER — HYDRALAZINE HYDROCHLORIDE 20 MG/ML
10 INJECTION INTRAMUSCULAR; INTRAVENOUS
Status: ACTIVE | OUTPATIENT
Start: 2022-07-22 | End: 2022-07-22

## 2022-07-22 RX ORDER — FENTANYL CITRATE 50 UG/ML
INJECTION, SOLUTION INTRAMUSCULAR; INTRAVENOUS PRN
Status: DISCONTINUED | OUTPATIENT
Start: 2022-07-22 | End: 2022-07-22 | Stop reason: SDUPTHER

## 2022-07-22 RX ORDER — SODIUM CHLORIDE 0.9 % (FLUSH) 0.9 %
5-40 SYRINGE (ML) INJECTION EVERY 12 HOURS SCHEDULED
Status: DISCONTINUED | OUTPATIENT
Start: 2022-07-22 | End: 2022-07-22 | Stop reason: SDUPTHER

## 2022-07-22 RX ORDER — DIPHENHYDRAMINE HCL 25 MG
25 CAPSULE ORAL EVERY 6 HOURS PRN
Status: DISCONTINUED | OUTPATIENT
Start: 2022-07-22 | End: 2022-07-23 | Stop reason: HOSPADM

## 2022-07-22 RX ORDER — MIDAZOLAM HYDROCHLORIDE 2 MG/2ML
2 INJECTION, SOLUTION INTRAMUSCULAR; INTRAVENOUS
Status: COMPLETED | OUTPATIENT
Start: 2022-07-22 | End: 2022-07-22

## 2022-07-22 RX ORDER — PROPOFOL 10 MG/ML
INJECTION, EMULSION INTRAVENOUS PRN
Status: DISCONTINUED | OUTPATIENT
Start: 2022-07-22 | End: 2022-07-22 | Stop reason: SDUPTHER

## 2022-07-22 RX ORDER — CELECOXIB 200 MG/1
200 CAPSULE ORAL DAILY PRN
Qty: 60 CAPSULE | Refills: 2 | Status: SHIPPED | OUTPATIENT
Start: 2022-07-22

## 2022-07-22 RX ORDER — SODIUM CHLORIDE 0.9 % (FLUSH) 0.9 %
5-40 SYRINGE (ML) INJECTION EVERY 12 HOURS SCHEDULED
Status: DISCONTINUED | OUTPATIENT
Start: 2022-07-22 | End: 2022-07-23 | Stop reason: HOSPADM

## 2022-07-22 RX ORDER — DEXAMETHASONE SODIUM PHOSPHATE 10 MG/ML
INJECTION INTRAMUSCULAR; INTRAVENOUS PRN
Status: DISCONTINUED | OUTPATIENT
Start: 2022-07-22 | End: 2022-07-22 | Stop reason: SDUPTHER

## 2022-07-22 RX ORDER — ONDANSETRON 2 MG/ML
4 INJECTION INTRAMUSCULAR; INTRAVENOUS EVERY 6 HOURS PRN
Status: DISCONTINUED | OUTPATIENT
Start: 2022-07-22 | End: 2022-07-23 | Stop reason: HOSPADM

## 2022-07-22 RX ORDER — OXYCODONE HYDROCHLORIDE 5 MG/1
5-10 TABLET ORAL EVERY 4 HOURS PRN
Qty: 60 TABLET | Refills: 0 | Status: SHIPPED | OUTPATIENT
Start: 2022-07-22 | End: 2022-07-27

## 2022-07-22 RX ORDER — SODIUM CHLORIDE 9 MG/ML
INJECTION, SOLUTION INTRAVENOUS PRN
Status: DISCONTINUED | OUTPATIENT
Start: 2022-07-22 | End: 2022-07-23 | Stop reason: HOSPADM

## 2022-07-22 RX ORDER — HYDROMORPHONE HYDROCHLORIDE 1 MG/ML
1 INJECTION, SOLUTION INTRAMUSCULAR; INTRAVENOUS; SUBCUTANEOUS
Status: DISCONTINUED | OUTPATIENT
Start: 2022-07-22 | End: 2022-07-23 | Stop reason: HOSPADM

## 2022-07-22 RX ORDER — ACETAMINOPHEN 500 MG
1000 TABLET ORAL EVERY 6 HOURS PRN
COMMUNITY

## 2022-07-22 RX ORDER — LISINOPRIL AND HYDROCHLOROTHIAZIDE 12.5; 1 MG/1; MG/1
1 TABLET ORAL DAILY
Status: DISCONTINUED | OUTPATIENT
Start: 2022-07-22 | End: 2022-07-23 | Stop reason: HOSPADM

## 2022-07-22 RX ORDER — OXYCODONE HYDROCHLORIDE 5 MG/1
5 TABLET ORAL PRN
Status: DISCONTINUED | OUTPATIENT
Start: 2022-07-22 | End: 2022-07-22 | Stop reason: HOSPADM

## 2022-07-22 RX ORDER — ASPIRIN 81 MG/1
81 TABLET ORAL 2 TIMES DAILY
Status: DISCONTINUED | OUTPATIENT
Start: 2022-07-22 | End: 2022-07-23 | Stop reason: HOSPADM

## 2022-07-22 RX ORDER — SODIUM CHLORIDE 9 MG/ML
INJECTION, SOLUTION INTRAVENOUS PRN
Status: DISCONTINUED | OUTPATIENT
Start: 2022-07-22 | End: 2022-07-22 | Stop reason: HOSPADM

## 2022-07-22 RX ORDER — SODIUM CHLORIDE, SODIUM LACTATE, POTASSIUM CHLORIDE, CALCIUM CHLORIDE 600; 310; 30; 20 MG/100ML; MG/100ML; MG/100ML; MG/100ML
INJECTION, SOLUTION INTRAVENOUS CONTINUOUS
Status: DISCONTINUED | OUTPATIENT
Start: 2022-07-22 | End: 2022-07-22

## 2022-07-22 RX ORDER — HYDROMORPHONE HYDROCHLORIDE 1 MG/ML
0.5 INJECTION, SOLUTION INTRAMUSCULAR; INTRAVENOUS; SUBCUTANEOUS EVERY 10 MIN PRN
Status: DISCONTINUED | OUTPATIENT
Start: 2022-07-22 | End: 2022-07-22 | Stop reason: HOSPADM

## 2022-07-22 RX ORDER — ACETAMINOPHEN 500 MG
1000 TABLET ORAL EVERY 6 HOURS
Status: DISCONTINUED | OUTPATIENT
Start: 2022-07-22 | End: 2022-07-23 | Stop reason: HOSPADM

## 2022-07-22 RX ORDER — OXYCODONE HYDROCHLORIDE 5 MG/1
10 TABLET ORAL PRN
Status: DISCONTINUED | OUTPATIENT
Start: 2022-07-22 | End: 2022-07-22 | Stop reason: HOSPADM

## 2022-07-22 RX ORDER — OXYCODONE HYDROCHLORIDE 5 MG/1
10 TABLET ORAL EVERY 4 HOURS PRN
Status: DISCONTINUED | OUTPATIENT
Start: 2022-07-22 | End: 2022-07-23 | Stop reason: HOSPADM

## 2022-07-22 RX ORDER — CYCLOBENZAPRINE HCL 10 MG
10 TABLET ORAL 2 TIMES DAILY PRN
Status: DISCONTINUED | OUTPATIENT
Start: 2022-07-22 | End: 2022-07-23 | Stop reason: HOSPADM

## 2022-07-22 RX ORDER — ASPIRIN 81 MG/1
81 TABLET ORAL EVERY 12 HOURS
Qty: 70 TABLET | Refills: 0 | Status: SHIPPED | OUTPATIENT
Start: 2022-07-22 | End: 2022-08-26

## 2022-07-22 RX ORDER — SODIUM CHLORIDE 0.9 % (FLUSH) 0.9 %
5-40 SYRINGE (ML) INJECTION PRN
Status: DISCONTINUED | OUTPATIENT
Start: 2022-07-22 | End: 2022-07-23 | Stop reason: HOSPADM

## 2022-07-22 RX ORDER — ROPIVACAINE HYDROCHLORIDE 2 MG/ML
INJECTION, SOLUTION EPIDURAL; INFILTRATION; PERINEURAL PRN
Status: DISCONTINUED | OUTPATIENT
Start: 2022-07-22 | End: 2022-07-22 | Stop reason: ALTCHOICE

## 2022-07-22 RX ORDER — SODIUM CHLORIDE 0.9 % (FLUSH) 0.9 %
5-40 SYRINGE (ML) INJECTION PRN
Status: DISCONTINUED | OUTPATIENT
Start: 2022-07-22 | End: 2022-07-22 | Stop reason: HOSPADM

## 2022-07-22 RX ORDER — SENNA AND DOCUSATE SODIUM 50; 8.6 MG/1; MG/1
1 TABLET, FILM COATED ORAL 2 TIMES DAILY
Status: DISCONTINUED | OUTPATIENT
Start: 2022-07-22 | End: 2022-07-23 | Stop reason: HOSPADM

## 2022-07-22 RX ORDER — KETOROLAC TROMETHAMINE 30 MG/ML
INJECTION, SOLUTION INTRAMUSCULAR; INTRAVENOUS PRN
Status: DISCONTINUED | OUTPATIENT
Start: 2022-07-22 | End: 2022-07-22 | Stop reason: ALTCHOICE

## 2022-07-22 RX ORDER — PROMETHAZINE HYDROCHLORIDE 25 MG/1
25 TABLET ORAL EVERY 8 HOURS PRN
Qty: 30 TABLET | Refills: 1 | Status: SHIPPED | OUTPATIENT
Start: 2022-07-22

## 2022-07-22 RX ORDER — ATORVASTATIN CALCIUM 10 MG/1
10 TABLET, FILM COATED ORAL
Status: DISCONTINUED | OUTPATIENT
Start: 2022-07-22 | End: 2022-07-23 | Stop reason: HOSPADM

## 2022-07-22 RX ORDER — ONDANSETRON 4 MG/1
4 TABLET, ORALLY DISINTEGRATING ORAL EVERY 8 HOURS PRN
Status: DISCONTINUED | OUTPATIENT
Start: 2022-07-22 | End: 2022-07-23 | Stop reason: HOSPADM

## 2022-07-22 RX ORDER — ROPIVACAINE HYDROCHLORIDE 2 MG/ML
INJECTION, SOLUTION EPIDURAL; INFILTRATION; PERINEURAL
Status: COMPLETED | OUTPATIENT
Start: 2022-07-22 | End: 2022-07-22

## 2022-07-22 RX ORDER — FENTANYL CITRATE 50 UG/ML
100 INJECTION, SOLUTION INTRAMUSCULAR; INTRAVENOUS
Status: COMPLETED | OUTPATIENT
Start: 2022-07-22 | End: 2022-07-22

## 2022-07-22 RX ADMIN — MIDAZOLAM 2 MG: 1 INJECTION, SOLUTION INTRAMUSCULAR; INTRAVENOUS at 06:34

## 2022-07-22 RX ADMIN — DEXAMETHASONE SODIUM PHOSPHATE 5 MG: 10 INJECTION INTRAMUSCULAR; INTRAVENOUS at 06:35

## 2022-07-22 RX ADMIN — HYDROMORPHONE HYDROCHLORIDE 0.5 MG: 1 INJECTION, SOLUTION INTRAMUSCULAR; INTRAVENOUS; SUBCUTANEOUS at 10:00

## 2022-07-22 RX ADMIN — Medication 10 MG: at 07:57

## 2022-07-22 RX ADMIN — ACETAMINOPHEN 1000 MG: 500 TABLET, FILM COATED ORAL at 23:00

## 2022-07-22 RX ADMIN — ACETAMINOPHEN 1000 MG: 500 TABLET, FILM COATED ORAL at 17:10

## 2022-07-22 RX ADMIN — OXYCODONE 5 MG: 5 TABLET ORAL at 23:53

## 2022-07-22 RX ADMIN — OXYCODONE 10 MG: 5 TABLET ORAL at 11:10

## 2022-07-22 RX ADMIN — TRANEXAMIC ACID 1000 MG: 100 INJECTION, SOLUTION INTRAVENOUS at 07:46

## 2022-07-22 RX ADMIN — ACETAMINOPHEN 1000 MG: 500 TABLET, FILM COATED ORAL at 11:10

## 2022-07-22 RX ADMIN — SUGAMMADEX 200 MG: 100 INJECTION, SOLUTION INTRAVENOUS at 09:15

## 2022-07-22 RX ADMIN — ACETAMINOPHEN 1000 MG: 500 TABLET, FILM COATED ORAL at 05:55

## 2022-07-22 RX ADMIN — ASPIRIN 81 MG: 81 TABLET, COATED ORAL at 21:10

## 2022-07-22 RX ADMIN — FENTANYL CITRATE 100 MCG: 50 INJECTION INTRAMUSCULAR; INTRAVENOUS at 09:25

## 2022-07-22 RX ADMIN — SODIUM CHLORIDE, POTASSIUM CHLORIDE, SODIUM LACTATE AND CALCIUM CHLORIDE: 600; 310; 30; 20 INJECTION, SOLUTION INTRAVENOUS at 05:55

## 2022-07-22 RX ADMIN — ONDANSETRON 4 MG: 2 INJECTION INTRAMUSCULAR; INTRAVENOUS at 07:50

## 2022-07-22 RX ADMIN — ROPIVACAINE HYDROCHLORIDE 20 ML: 2 INJECTION, SOLUTION EPIDURAL; INFILTRATION at 06:34

## 2022-07-22 RX ADMIN — Medication 2 G: at 07:46

## 2022-07-22 RX ADMIN — CEFAZOLIN 2000 MG: 10 INJECTION, POWDER, FOR SOLUTION INTRAVENOUS at 23:00

## 2022-07-22 RX ADMIN — LIDOCAINE HYDROCHLORIDE 50 MG: 20 INJECTION, SOLUTION EPIDURAL; INFILTRATION; INTRACAUDAL; PERINEURAL at 07:42

## 2022-07-22 RX ADMIN — FENTANYL CITRATE 25 MCG: 50 INJECTION INTRAMUSCULAR; INTRAVENOUS at 06:34

## 2022-07-22 RX ADMIN — SENNOSIDES AND DOCUSATE SODIUM 1 TABLET: 50; 8.6 TABLET ORAL at 21:10

## 2022-07-22 RX ADMIN — CEFAZOLIN 2000 MG: 10 INJECTION, POWDER, FOR SOLUTION INTRAVENOUS at 16:30

## 2022-07-22 RX ADMIN — HYDROMORPHONE HYDROCHLORIDE 0.5 MG: 2 INJECTION INTRAMUSCULAR; INTRAVENOUS; SUBCUTANEOUS at 08:17

## 2022-07-22 RX ADMIN — SODIUM CHLORIDE, PRESERVATIVE FREE 10 ML: 5 INJECTION INTRAVENOUS at 21:10

## 2022-07-22 RX ADMIN — DEXAMETHASONE SODIUM PHOSPHATE 10 MG: 10 INJECTION INTRAMUSCULAR; INTRAVENOUS at 07:50

## 2022-07-22 RX ADMIN — PROPOFOL 100 MG: 10 INJECTION, EMULSION INTRAVENOUS at 07:42

## 2022-07-22 RX ADMIN — HYDROMORPHONE HYDROCHLORIDE 0.5 MG: 1 INJECTION, SOLUTION INTRAMUSCULAR; INTRAVENOUS; SUBCUTANEOUS at 09:50

## 2022-07-22 RX ADMIN — ROCURONIUM BROMIDE 50 MG: 50 INJECTION, SOLUTION INTRAVENOUS at 07:42

## 2022-07-22 ASSESSMENT — PAIN DESCRIPTION - DESCRIPTORS
DESCRIPTORS: ACHING;SORE
DESCRIPTORS: ACHING;SORE
DESCRIPTORS: ACHING
DESCRIPTORS: ACHING
DESCRIPTORS: ACHING;SORE

## 2022-07-22 ASSESSMENT — PAIN DESCRIPTION - LOCATION
LOCATION: KNEE

## 2022-07-22 ASSESSMENT — KOOS JR
STANDING UPRIGHT: 1
RISING FROM SITTING: 1
STRAIGHTENING KNEE FULLY: 1
KOOS JR TOTAL INTERVAL SCORE: 68.284
BENDING TO THE FLOOR TO PICK UP OBJECT: 1
GOING UP OR DOWN STAIRS: 1
TWISING OR PIVOTING ON KNEE: 1
HOW SEVERE IS YOUR KNEE STIFFNESS AFTER FIRST WAKING IN MORNING: 1

## 2022-07-22 ASSESSMENT — PAIN DESCRIPTION - ORIENTATION
ORIENTATION: RIGHT

## 2022-07-22 ASSESSMENT — PAIN SCALES - GENERAL
PAINLEVEL_OUTOF10: 7
PAINLEVEL_OUTOF10: 0
PAINLEVEL_OUTOF10: 1
PAINLEVEL_OUTOF10: 2
PAINLEVEL_OUTOF10: 3
PAINLEVEL_OUTOF10: 6
PAINLEVEL_OUTOF10: 0
PAINLEVEL_OUTOF10: 6
PAINLEVEL_OUTOF10: 3
PAINLEVEL_OUTOF10: 7

## 2022-07-22 ASSESSMENT — PAIN DESCRIPTION - PAIN TYPE
TYPE: SURGICAL PAIN
TYPE: SURGICAL PAIN

## 2022-07-22 ASSESSMENT — PAIN - FUNCTIONAL ASSESSMENT
PAIN_FUNCTIONAL_ASSESSMENT: PREVENTS OR INTERFERES SOME ACTIVE ACTIVITIES AND ADLS
PAIN_FUNCTIONAL_ASSESSMENT: 0-10
PAIN_FUNCTIONAL_ASSESSMENT: PREVENTS OR INTERFERES SOME ACTIVE ACTIVITIES AND ADLS

## 2022-07-22 ASSESSMENT — LIFESTYLE VARIABLES: SMOKING_STATUS: 1

## 2022-07-22 NOTE — INTERVAL H&P NOTE
Update History & Physical    The patient's History and Physical of July 12, H&P was reviewed with the patient and I examined the patient. There was no change. The surgical site was confirmed by the patient and me. Plan: The risks, benefits, expected outcome, and alternative to the recommended procedure have been discussed with the patient. Patient understands and wants to proceed with the procedure.      Electronically signed by Kody Don MD on 7/22/2022 at 6:44 AM

## 2022-07-22 NOTE — PROGRESS NOTES
TRANSFER - OUT REPORT:    Verbal report given to 3801 Trice Uribe RN [facility]  [facility] transferred to [address] [place] [address] for routine post-op       Report consisted of patient's Situation, Background, Assessment and   Recommendations(SBAR). Information from the following report(s) Nurse Handoff Report, Adult Overview, Surgery Report, Intake/Output, MAR, and Cardiac Rhythm NSR/ Sinus Ramandeep Luciano  was reviewed with the receiving nurse. Lines:   Peripheral IV 07/22/22 Right Hand (Active)   Site Assessment Clean, dry & intact 07/22/22 0937   Line Status Infusing 07/22/22 0937   Line Care Connections checked and tightened 07/22/22 0937   Phlebitis Assessment No symptoms 07/22/22 0937   Infiltration Assessment 0 07/22/22 0937   Dressing Status Clean, dry & intact 07/22/22 0937   Dressing Type Transparent 07/22/22 3090        Opportunity for questions and clarification was provided.       Patient transported with:  O2 @ 4lpm and Tech

## 2022-07-22 NOTE — OP NOTE
[facility] Robotic Assisted Total Knee Arthroplasty: Posterior Cruciate Retaining       Anika Caruso   : 1945  Medical Record [id number]  Pre-operative Diagnosis:  Arthritis of knee, right [M17.11]  Post-operative Diagnosis: Arthritis of knee, right [M17.11]  Location: [address] [place] [address]  Surgeon: Marco A Artis MD   Assistant: Mj Tejada    Anesthesia: General and ACB    Indications: Patient has end stage arthritis. They have tried and failed conservative management. Procedure:Procedure(s) (LRB):  RIGHT KNEE TOTAL ARTHROPLASTY ROBOTIC   (Right)            CPT- 26379- Total knee arthroplasty           63587- Other procedures on musculoskeletal system            0055T- Computer assisted surgical navigation   The complexity of the total joint surgery requires the use of a first assistant for positioning, retraction and expertise in closure. Tourniquet Time: 0 minutes  EBL: 250 cc  Findings: severe degenerative arthritis with loss of cartilage in weight bearing compartments of the knee,  patellar osteophytes with loss of patella cartilage, posterior femoral osteophytes   BMI: Body mass index is 31.09 kg/m². Donna Leon was brought to the operating room and positioned on the operating table. She was anesthestized with anesthesia. IV antibiotics were administered. Prior to the incision being made a timeout was called identifying the patient, procedure ,operative side and surgeon The operative leg was prepped and draped in the usual sterile manner. An anterior longitudinal incision was accomplished just medial to the tibial tubercle and extending approximal 6 centimeters proximal to the superior pole of the patella. A medial parapatellar capsular incision was performed. The medial capsular flap was elevated around to the insertion of the semimembranous tendon. The patella was everted and the knee flexed and externally rotated.   The medial and external menisci were excised. The lateral half of the fat pad excised and the patella femoral ligament was released. The anterior cruciate ligament was resect and the posterior cruciate ligament was retained. The femoral and tibial arrays were pinned in place and registered with the [employer] 92. The patient landmarks were collected and the tibial and femoral checkpoints were registered and verified. The preresection balancing was performed. The distal femur was addressed first. Utilizing the [facility] robotic arm the distal femoral cut was made. The anterior and posterior cuts were then made. The osteophytes were removed from the tibial and femoral surfaces. The tibia was then addressed. The [employer] robotic arm was then used to make the measured resection of the tibia. The tibia was sized. The tibial base plate was pinned into place with the appropriate external rotation and stem site prepared. A trial femoral component and poly was placed. A preliminary range of motion was accomplished with the trial components. The patient was found to obtain full extension as well as appropriate flexion. The patient's ligaments were stable in flexion and extension to medial and lateral stressing and the alignment was through the appropriate mechanical axis. Additional surgical procedures included: none. The patella was then everted. The bone was resect to accommodate the three peg patellar button. A trial reduction of the patella revealed appropriate tracking through the patellofemoral groove with no lateral retinacular release being accomplished. All trial components were removed. The real implants were opened: Sizes listed below. The knee was irrigated. There were no femoral deficiencies. There were no tibial deficiencies. No augmentation was utilized. The tibial component was impacted into place. The femoral and patella components were cemented into place.     David Pane knee was placed through range of motion and noted to be stable as mentioned above with the trail components. The wound was dry, therefore no drain was used. The operative knee was injected with 60 cc of Naropin, 10 cc's of morphine and 1 cc of 30 mg of Toradol. The knee was then soaked with a diluted betadine solution for approximately 3 min. This was then thoroughly irrigated. The capsular layer was closed using a #1 Stratafix suture. Then, 1 gram (100 mg/ml) of Transexamic Acid was injected into the joint space. The subcutaneous layers were closed using 2-0 Stratafix. Finally the skin was closed using 3-0 Vicryl and staples which were applied in occlusive fashion and sterile bandage applied. An Iceman cryo pad was applied on the operative leg. Sponge count and needle counts were correct. Jovany Izaiah left the operating room     Implants:   Implant Name Type Inv.  Item Serial No.  Lot No. LRB No. Used Action   COMPONENT PAT BOO86LY [id number] SUP INFERIOR ASYM TRIATHLON - [id number]  COMPONENT PAT [id number] SUP INFERIOR ASYM TRIATHLON  [employer] Seeking [id number] Right 1 Implanted   COMPONENT FEM SZ 4 R ANT KNEE CRUCE RET QUE REFERENCING - [id number]  COMPONENT FEM SZ 4 R ANT KNEE CRUCE RET QUE REFERENCING  [employer] Seeking [id number] Right 1 Implanted   BASEPLATE TIB SZ 4 [id number] ML70MM KNEE TRITANIUM 4 CRUCFRM - [id number]  BASEPLATE TIB SZ 4 [id number] ML70MM KNEE TRITANIUM 4 CRUCFRM  [employer] Seeking [id number] Right 1 Implanted   CEMENT BONE 40GM HI VISC PALACOS R - [id number]  CEMENT BONE 40GM HI VISC PALACOS R  [facility] MEDICAL-WD [id number] Right 1 Implanted   INSERT TIB CS 4 10 MM ARTC KNEE BEAR TECHNOLOGY [facility] - [id number]  INSERT TIB CS 4 10 MM ARTC KNEE BEAR TECHNOLOGY TRIATHLON  [facility] ORTHOPEDICS [facility] [id number] Right 1 Implanted         Signed By: Hillary Holguin MD   7/22/2022,  8:48 AM

## 2022-07-22 NOTE — ANESTHESIA PRE PROCEDURE
Department of Anesthesiology  Preprocedure Note       Name:  Steffen Woodward   Age:  64 y.o.  :  1945                                          MRN:  [id number]         Date:  2022      Surgeon: Jason Rivera):  Heaven Escoto MD    Procedure: Procedure(s):  rt KNEE TOTAL ARTHROPLASTY stevenson polo  *SDD*    Medications prior to admission:   Prior to Admission medications    Medication Sig Start Date End Date Taking? Authorizing Provider   acetaminophen (TYLENOL) 500 MG tablet Take 1,000 mg by mouth every 6 hours as needed for Pain   Yes Historical Provider, MD   lisinopril-hydroCHLOROthiazide (PRINZIDE;ZESTORETIC) 10-12.5 MG per tablet Take 1 tablet by mouth in the morning.    Yes Historical Provider, MD   atorvastatin (LIPITOR) 10 MG tablet Take 10 mg by mouth three times a week M, W, F   Yes Historical Provider, MD   Ergocalciferol (VITAMIN D2 PO) Take by mouth 1.25 mg/50,000 units per patient   Yes Historical Provider, MD   levothyroxine (SYNTHROID) 75 MCG tablet Take 75 mcg by mouth Daily    Historical Provider, MD   ferrous sulfate (SLOW FE) 142 (45 Fe) MG extended release tablet Take 142 mg by mouth daily    Historical Provider, MD   calcium carbonate (OSCAL) 500 MG TABS tablet Take 500 mg by mouth daily    Historical Provider, MD   vitamin D3 (CHOLECALCIFEROL) 10 MCG (400 UNIT) TABS tablet Take 400 Units by mouth daily    Historical Provider, MD   Cholecalciferol (VITAMIN D3) 1.25 MG (74947 UT) TABS Take by mouth once a week    Historical Provider, MD   cyclobenzaprine (FLEXERIL) 10 MG tablet Take 10 mg by mouth 2 times daily as needed 21   Ar Automatic Reconciliation   naproxen (NAPROSYN) 500 MG tablet Take 500 mg by mouth 2 times daily as needed  21   Ar Automatic Reconciliation       Current medications:    Current Facility-Administered Medications   Medication Dose Route Frequency Provider Last Rate Last Admin    lactated ringers infusion   IntraVENous Continuous Lorena Dangelo  mL/hr at 22 0555 New Bag at 22 0555    sodium chloride flush 0.9 % injection 5-40 mL  5-40 mL IntraVENous 2 times per day Shae Falk MD        sodium chloride flush 0.9 % injection 5-40 mL  5-40 mL IntraVENous PRN Shae Falk MD        tranexamic acid-NaCl 1,000 mg IVPB   IntraVENous On Call to [address] [place]        0.9 % sodium chloride infusion   IntraVENous PRN ART Mcgill        ceFAZolin (ANCEF) 2000 mg in sterile water 20 mL IV syringe  2,000 mg IntraVENous On Call to [address] [place] [place]         Facility-Administered Medications Ordered in Other Encounters   Medication Dose Route Frequency Provider Last Rate Last Admin    dexamethasone (DECADRON) injection   Other PRN Shae Falk MD   5 mg at 22 1693       Allergies:     Allergies   Allergen Reactions    Ciprofloxacin Other (See Comments)    Ak-Mycin [Erythromycin] Rash       Problem List:    Patient Active Problem List   Diagnosis Code    Arthritis of knee, right M17.11    Unilateral primary osteoarthritis, right knee M17.11       Past Medical History:        Diagnosis Date    Arthritis     Hypertension     Managed with meds     Kidney stone        Past Surgical History:        Procedure Laterality Date    ESOPHAGOGASTRODUODENOSCOPY      JOINT REPLACEMENT Left 2012       Social History:    Social History     Tobacco Use    Smoking status: Former     Packs/day: 1.00     Years: 30.00     Pack years: 30.00     Types: Cigarettes     Quit date:      Years since quittin.    Smokeless tobacco: Never   Substance Use Topics    Alcohol use: Yes     Comment: occas                                Counseling given: Not Answered      Vital Signs (Current):   Vitals:    22 0537 22 0630   BP: 139/81 (!) 143/63   Pulse: 74 64   Resp: 16 16   Temp: 97 °F (36.1 °C)    TempSrc: Temporal    SpO2: 95% 99%   Weight: 175 lb 8 oz (79.6 kg)    Height: 5' 3\" (1.6 m) distance: >3 FB   Neck ROM: full     Dental: normal exam         Pulmonary:Negative Pulmonary ROS breath sounds clear to auscultation  (+) current smoker                           Cardiovascular:  Exercise tolerance: good (>4 METS),   (+) hypertension:,         Rhythm: regular  Rate: normal                    Neuro/Psych:   Negative Neuro/Psych ROS              GI/Hepatic/Renal: Neg GI/Hepatic/Renal ROS       Morbid obesity: obese. Endo/Other:    (+) : arthritis:., .                 Abdominal:             Vascular: negative vascular ROS. Other Findings:           Anesthesia Plan      spinal     ASA 2       Induction: intravenous. Anesthetic plan and risks discussed with patient.               Post-op pain plan if not by surgeon: single peripheral nerve block            Chilango Bailey MD   7/22/2022

## 2022-07-22 NOTE — CARE COORDINATION
Patient is a 64y.o. year old female admitted for Right TKA . Patient plans to return home on discharge. Order received to arrange home health. Patient without preference towards agency. [facility] Patient has a raised toilet seat. Patient requesting we arrange a walker. Referral sent to [address] delivered to the hospital room prior to discharge. Will follow until discharge. 07/22/22 2587   Service Assessment   Patient Orientation Alert and Oriented   Cognition Alert   History Provided By Patient   Primary Caregiver Self   Services At/After Discharge   Transition of Care Consult (CM Consult) [facility] Discharge PT; Home Health   Condition of Participation: Discharge Planning   The Plan for Transition of Care is related to the following treatment goals: improve mobility   The Patient and/or Patient Representative was provided with a Choice of Provider? Patient   The Patient and/Or Patient Representative agree with the Discharge Plan? Yes   Freedom of Choice list was provided with basic dialogue that supports the patient's individualized plan of care/goals, treatment preferences, and shares the quality data associated with the providers?   Yes

## 2022-07-22 NOTE — PROGRESS NOTES
OCCUPATIONAL THERAPY Initial Assessment, Daily Note, and PM      (Link to Caseload Tracking:    OT Orders   Time  OT Charge Capture  Rehab Caseload Tracker  Episode     Nancy Beal is a 64 y.o. female   PRIMARY DIAGNOSIS: Arthritis of knee, right  Arthritis of knee, right [M17.11]  Unilateral primary osteoarthritis, right knee [M17.11]  Procedure(s) (LRB):  RIGHT KNEE TOTAL ARTHROPLASTY ROBOTIC   (Right)  Day of Surgery  Reason for Referral: Pain in Right Knee (M25.561)  Stiffness of Right Knee, Not elsewhere classified (M25.661)  Outpatient in a bed: Payor: [employer] MEDICARE / Plan: [employer] GOLD PLUS HMO / Product Type: *No Product type* /     ASSESSMENT:     REHAB RECOMMENDATIONS:   Recommendation to date pending progress:  Setting:  Home Health Therapy    Equipment:    3 in 1 Bedside Commode  Rolling Walker     ASSESSMENT:  Ms. Britany Jin is s/p right TKA and presents with decreased independence with functional mobility and activities of daily living as compared to baseline level of function and safety. Patient would benefit from skilled Occupational Therapy to maximize independence and safety with self-care task and functional mobility. Patient able to completed self care task lower body dressing, upper body dressing, toileting, grooming, and functional mobility  at bathroom with minimal assist assist.  Mobilized from hospital bed to bathroom using a rolling walker with assist. Patient is hopeful to spend the night to better prepare for safe discharge home. Pt is moving well. Pt up in room, to bathroom for toileting donned clothes and groomed at sink. Pt ambulated in espinosa household distance. Pt to recliner with ice applied and all needs in reach, family present.       [address] [facility] [address] [address] AM-PAC 6 Clicks Daily Activity Inpatient Short Form:    AM-PAC Daily Activity Inpatient   How much help for putting on and taking off regular lower body clothing?: A Little  How much help for Bathing?: A Little  How much help for Toileting?: A Little  How much help for putting on and taking off regular upper body clothing?: None  How much help for taking care of personal grooming?: None  How much help for eating meals?: None  AM-PAC Inpatient Daily Activity Raw Score: 21  AM-PAC Inpatient ADL T-Scale Score : 44.27  ADL Inpatient CMS 0-100% Score: 32.79  ADL Inpatient CMS G-Code Modifier : CJ     SUBJECTIVE:     Ms. Mono Welsh states she is going to spend the night        Social/Functional   Lives With: Spouse  Type of Home: House  Home Layout: One level  Home Access: Stairs to enter without rails  Entrance Stairs - Number of Steps: 2  Ambulation Assistance: Independent  Transfer Assistance: Independent    OBJECTIVE:     Supriya Sparks / Beto Regan / Kait Fluke: None    RESTRICTIONS/PRECAUTIONS:  Restrictions/Precautions: Weight Bearing, Fall Risk  Right Lower Extremity Weight Bearing: Weight Bearing As Tolerated    PAIN: VITALS / O2:   Pre Treatment:          Post Treatment: pt without complaint of pain Vitals          Oxygen        GROSS EVALUATION: INTACT IMPAIRED   (See Comments)   UE AROM [x] []   UE PROM [x] []   Strength [x]       Posture / Balance []  Slightly decreased standing balance.     Sensation [x]     Coordination [x]       Tone [x]       Edema []    Activity Tolerance [x]       Hand Dominance R [] L []      COGNITION/  PERCEPTION: INTACT IMPAIRED   (See Comments)   Orientation [x]     Vision [x]     Hearing [x]     Cognition  [x]     Perception [x]       MOBILITY: I Mod I S SBA CGA Min Mod Max Total  NT x2 Comments:   Bed Mobility    Rolling [] [] [] [] [] [] [] [] [] [] []    Supine to Sit [] [] [] [x] [] [] [] [] [] [] []    Scooting [] [] [] [x] [] [] [] [] [] [] []    Sit to Supine [] [] [] [] [] [] [] [] [] [] []    Transfers    Sit to Stand [] [] [] [] [x] [] [] [] [] [] []    Bed to Chair [] [] [] [] [x] [] [] [] [] [] []    Stand to Sit [] [] [] [] [x] [] [] [] [] [] []    Tub/Shower [] [] [] [] [] [x] [] [] [] [] []       Toilet [] [] [] [] [] [x] [] [] [] [] []        [] [] [] [] [] [] [] [] [] [] []    I=Independent, Mod I=Modified Independent, S=Supervision/Setup, SBA=Standby Assistance, CGA=Contact Guard Assistance, Min=Minimal Assistance, Mod=Moderate Assistance, Max=Maximal Assistance, Total=Total Assistance, NT=Not Tested    ACTIVITIES OF DAILY LIVING: I Mod I S SBA CGA Min Mod Max Total NT Comments   BASIC ADLs:              Upper Body Bathing [] [] [] [x] [] [] [] [] [] []    Lower Body Bathing [] [] [] [] [] [x] [] [] [] []    Toileting [] [] [] [] [x] [] [] [] [] []    Upper Body Dressing [] [] [] [x] [] [] [] [] [] []    Lower Body Dressing [] [] [] [] [] [x] [] [] [] []    Feeding [x] [] [] [] [] [] [] [] [] []    Grooming [] [] [] [] [x] [] [] [] [] []    Personal Device Care [] [] [] [] [] [] [] [] [] []    Functional Mobility [] [] [] [] [x] [] [] [] [] []    I=Independent, Mod I=Modified Independent, S=Supervision/Setup, SBA=Standby Assistance, CGA=Contact Guard Assistance, Min=Minimal Assistance, Mod=Moderate Assistance, Max=Maximal Assistance, Total=Total Assistance, NT=Not Tested    PLAN:     FREQUENCY/DURATION   OT Plan of Care: 1 time/week for duration of hospital stay or until stated goals are met, whichever comes first.    ACUTE OCCUPATIONAL THERAPY GOALS:   (Developed with and agreed upon by patient and/or caregiver.)    GOALS:   DISCHARGE GOALS (in preparation for going home/rehab):  3 days  1. Ms. Verlie Oppenheim will perform lower body dressing activity with stand by assist required to demonstrate improved functional mobility and safety. 2.  Ms. Verlie Oppenheim will perform bathing activity with stand by assist required to demonstrate improved functional mobility and safety. 3.  Ms. Verlie Oppenheim will perform toileting activity with  stand by assist to demonstrate improved functional mobility and safety.   4.  Ms. Verlie Oppenheim will perform all functional transfers transfer with stand by assist to demonstrate improved functional mobility and safety. PROBLEM LIST:   (Skilled intervention is medically necessary to address:)  Decreased ADL/Functional Activities  Decreased Balance  Increased Pain   INTERVENTIONS PLANNED:   (Benefits and precautions of occupational therapy have been discussed with the patient.)  Self Care Training  Education         TREATMENT:     EVALUATION: LOW COMPLEXITY: (Untimed Charge)    TREATMENT:   Self Care: (30 min): Procedure(s) (per grid) utilized to improve and/or restore self-care/home management as related to dressing, toileting, grooming, and functional mobility . Required minimal verbal and manual cueing to facilitate activities of daily living skills and compensatory activities.       AFTER TREATMENT PRECAUTIONS: Bed/Chair Locked, Call light within reach, Chair, Needs within reach, and Visitors at bedside    INTERDISCIPLINARY COLLABORATION:  RN/ PCT, PT/ PTA, and OT/ PARKER    EDUCATION:  Education Given To: Family, Patient  Education Provided: Role of Therapy, Plan of Care, Precautions, ADL Adaptive Strategies, Transfer Training, IADL Safety, Equipment, Fall Prevention Strategies  [] Safe And Effective Hygiene  [x] Fall Precautions  [] Hip Precautions  [] D/C Instruction Review [] Prosthesis Review  [x] Walker Management/Safety  [x] Adaptive Equipment as Needed  [x] Therapeutic Resting Position of Joint       TOTAL TREATMENT DURATION AND TIME:  Time In: 9933  [facility]  Minutes: [address], OT

## 2022-07-22 NOTE — PROGRESS NOTES
TRANSFER - IN REPORT:    Verbal report received from Chandra Alvarez RN(name) on Earnstine Ra  being received from pacu(unit) for routine post-op      Report consisted of patients Situation, Background, Assessment and   Recommendations(SBAR). Information from the following report(s) Nurse Handoff Report was reviewed with the receiving nurse. Opportunity for questions and clarification was provided. Assessment completed upon patients arrival to unit and care assumed.

## 2022-07-22 NOTE — ANESTHESIA PROCEDURE NOTES
Airway  Date/Time: 7/22/2022 7:53 AM  Urgency: elective    Airway not difficult    General Information and Staff    Patient location during procedure: OR    Indications and Patient Condition  Indications for airway management: anesthesia and airway protection  Spontaneous Ventilation: absent  Sedation level: deep  Preoxygenated: yes  Patient position: sniffing  Mask difficulty assessment: vent by bag mask    Final Airway Details  Final airway type: endotracheal airway      Successful airway: ETT     Successful intubation technique: direct laryngoscopy  Facilitating devices/methods: intubating stylet  Blade: Marti  Blade size: #2  ETT size (mm): 7.0  Cormack-Lehane Classification: grade I - full view of glottis  Placement verified by: chest auscultation and capnometry   Measured from: lips  ETT to lips (cm): 22  Number of attempts at approach: 1

## 2022-07-22 NOTE — ANESTHESIA POSTPROCEDURE EVALUATION
Department of Anesthesiology  Postprocedure Note    Patient: Anatoliy Squires  MRN: [id number]  YOB: 1945  Date of evaluation: 7/22/2022      Procedure Summary     Date: 07/22/22 Room / Location: [facility] [place] [place] MAIN OR 08 / [facility] [place] [place] MAIN OR    Anesthesia Start: 3419 Anesthesia Stop: 2221    Procedure: RIGHT KNEE TOTAL ARTHROPLASTY ROBOTIC   (Right: Knee) Diagnosis:       Arthritis of knee, right      (Arthritis of knee, right [M17.11])    Surgeons: Vic Ulrich MD Responsible Provider: Venu Maravilla MD    Anesthesia Type: spinal ASA Status: 2          Anesthesia Type: No value filed.     Manish Phase I: Manish Score: 9    Manish Phase II:        Anesthesia Post Evaluation    Patient location during evaluation: PACU  Patient participation: complete - patient participated  Level of consciousness: awake  Airway patency: patent  Nausea & Vomiting: no nausea  Complications: no  Cardiovascular status: blood pressure returned to baseline and hemodynamically stable  Respiratory status: acceptable  Hydration status: stable  Multimodal analgesia pain management approach

## 2022-07-22 NOTE — PROGRESS NOTES
07/22/22 1632   Treatment   Treatment Type IS   Oxygen Therapy/Pulse Ox   O2 Therapy Room air   Heart Rate 65   SpO2 94 %   $Pulse Oximeter $Spot check (single)   Incentive Spirometry Tx   Treatment Effort Assisted by RT   Achieved Volume (mL) 2250 mL   Patient achieved   2250  Ml/sec on IS. Patient encouraged to do every hour while awake-patient agreed and demonstrated. No shortness of breath or distress noted. BS are clear b/l.

## 2022-07-22 NOTE — DISCHARGE INSTRUCTIONS
[facility]   Patient Discharge Instructions    Nancy Beal / [id number] : 1945    Admitted 2022 Discharged: 2022     IF YOU HAVE ANY PROBLEMS ONCE YOU ARE AT HOME CALL THE FOLLOWING NUMBERS:   Nurse's line: [phone]  Main office number: [phone]      Medications    The medications you are to continue on are listed on the medication reconciliation sheet. Narcotic pain medications as well as supplemental iron can cause constipation. If this occurs, try over the counter stool softeners or try stopping the narcotic pain medication and/or the iron. It is important that you take the medication exactly as they are prescribed. Medications which increase your risk of blood clots are listed to stop for 5 weeks after surgery as well as medications or supplements which increase your risk of bleeding complications. Keep your medication in the bottles provided by the pharmacist and keep a list of the medication names, dosages, and times to be taken in your wallet. Do not take other medications without consulting your doctor. If you need a refill on your pain medication, please note our office is closed over the weekend. It is our office policy that on-call providers cannot refill narcotic pain medications over the weekend. If you will need a refill over the weekend, please call our office before 12pm on Friday or first thing Monday morning. Important Information    Do NOT smoke as this will greatly increase your risk of infection! Resume your prehospital diet. If you have excessive nausea or vomitting call your doctor's office     Leg swelling and warmth is normal for 6 months after surgery. If you experience swelling in your leg, elevate your leg while laying down with your toes above your heart. If you have sudden onset severe swelling with leg pain call our office. Use Brian Hose stockings until we see you in the office for your follow up appointment.     The Patient or Representative Signature                                                          Date/Time

## 2022-07-22 NOTE — PROGRESS NOTES
function prior to returning home with caregiver.  .     Outcome Measure:   KOOS-JR:  KOJr MAXI. Knee Survey Score: 7    SUBJECTIVE:   Ms. Cain states, that was agreeable to therapy activity    Social/Functional Lives With: Spouse  Type of Home: House  Home Layout: One level  Home Access: Stairs to enter without rails (sunken den 1 step)  Entrance Stairs - Number of Steps: 2  Ambulation Assistance: Independent  Transfer Assistance: Independent    OBJECTIVE:     PAIN: VITAL SIGNS: LINES/DRAINS:   Pre Treatment:  Pain Assessment: 0-10  Pain Level: 3  Pain Location: Knee  Pain Orientation: Right  Non-Pharmaceutical Pain Intervention(s): Ambulation/Increased Activity;Cold pack      Post Treatment: 3/10 Vitals NT       Oxygen NT    IV    RESTRICTIONS/PRECAUTIONS:  Restrictions/Precautions: Weight Bearing, Fall Risk  Right Lower Extremity Weight Bearing: Weight Bearing As Tolerated                LOWER EXTREMITY GROSS EVALUATION:  RIGHT LE   Within Functional Limits   Abnormal   Comments   Strength [] [x]  Decreased but functional   Range of Motion [] [] AROM RLE (degrees)  R Knee Flexion 0-145: 120  R Knee Extension 0: 0      LEFT LE Within Functional Limits   Abnormal   Comments   Strength [x] []     Range of Motion [x] []       UPPER EXTREMITY GROSS EVALUATION:     Within Functional Limits   Abnormal   Comments   Strength [x] []    Range of Motion [x] []      SENSATION  Sensation [x]  grossly     COGNITION/  PERCEPTION: Intact Impaired (Comments):   Orientation [x]     Vision [x]     Hearing [x]     Cognition  [x]       MOBILITY: I Mod I S SBA CGA Min Mod Max Total  NT x2 Comments:   Bed Mobility    Rolling [] [] [] [x] [] [] [] [] [] [] []    Supine to Sit [] [] [] [x] [] [] [] [] [] [] []    Scooting [] [] [] [x] [] [] [] [] [] [] []    Sit to Supine [] [] [] [] [] [] [] [] [] [] []    Transfers    Sit to Stand [] [] [] [x] [] [] [] [] [] [] []    Bed to Chair [] [] [] [x] [] [] [] [] [] [] [] With walker   Stand to Sit [] [] [] x [] [] [] [] [] [] []    Stand Pivot [] [] [] [x] [] [] [] [] [] [] []    Toilet [] [] [] [x] [] [] [] [] [] [] []     [] [] [] [] [] [] [] [] [] [] []    I=Independent, Mod I=Modified Independent, S=Supervision, SBA=Standby Assistance, CGA=Contact Guard Assistance,   Min=Minimal Assistance, Mod=Moderate Assistance, Max=Maximal Assistance, Total=Total Assistance, NT=Not Tested    BALANCE: Good Fair+ Fair Fair- Poor NT Comments   Sitting Static [] [] [x] [] [] []    Sitting Dynamic [] [] [x] [] [] []              Standing Static [] [] [x] [x] [] [] With walker    Standing Dynamic [] [] [x] [x] [] [] With walker     PLAN:   ACUTE PHYSICAL THERAPY GOALS:   (Developed with and agreed upon by patient and/or caregiver.)  GOALS (1-4 days):  (1.)Ms. Steven Sánchez will move from supine to sit and sit to supine  in bed with INDEPENDENT. (2.)Ms. Steven Sánchez will transfer from bed to chair and chair to bed with SUPERVISION using the least restrictive device. (3.)Ms. Steven Sánchez will ambulate with SUPERVISION for 300 feet with the least restrictive device. (4.)Ms. Steven Sánchez will ambulate up/down 2 steps with no railing with MINIMAL ASSIST using  device PRN , also pt able to go up & down 1 step with walker & SBA.  (5.)Ms. Steven Sánchez will increase right knee ROM to 0-90°.  Met 7/22  ________________________________________________________________________________________________           FREQUENCY AND DURATION: BID for duration of hospital stay or until stated goals are met, whichever comes first.    THERAPY PROGNOSIS: Good    PROBLEM LIST:   (Skilled intervention is medically necessary to address:)  Decreased ADL/Functional Activities  Decreased Activity Tolerance  Decreased AROM/PROM  Decreased Balance  Decreased Coordination  Decreased Gait Ability  Decreased Safety Awareness  Decreased Strength  Decreased Transfer Abilities  Increased Pain   INTERVENTIONS PLANNED:   (Benefits and precautions of physical therapy have been discussed with the patient.)  Therapeutic Activity  Therapeutic Exercise/HEP  Gait Training  Education       TREATMENT:   EVALUATION: LOW COMPLEXITY: (Untimed Charge)    TREATMENT:   Therapeutic Activity (38 Minutes): Therapeutic activity included TKA exercises, review PT role, POC, & PT expectations, pre-gait wt shift & stepping exercise, bathroom transfer & progressive gait training an additional 150 ft after an initial 50 ft gait assessment to improve functional Activity tolerance, Balance, Coordination, Mobility, Strength, and ROM. TREATMENT GRID:  THERAPEUTIC  EXERCISES: DATE:  7/22 DATE:   DATE:      AM PM AM PM AM PM    [] [] [] [] [] []   Ankle Pumps  20       Quad Sets  20       Gluteal Sets  20       Hip Abd/ADduction  20       Straight Leg Raises  20       Knee Slides  20       Short Arc Quads  20       Chair Slides  20                         B = bilateral; AA = active assistive; A = active; P = passive      EDUCATION: Education Given To: Patient, Family  Education Provided: Role of Therapy, Plan of Care, Home Exercise Program, Precautions, Transfer Training, IADL Safety  Education Method: Teach Back, Verbal, Demonstration  Education Outcome: Continued education needed  EDUCATION:  [x] Home Exercises  [x] Fall Precautions  [x] No Pillow Under Knee  [] D/C Instruction Review [x] Cryocuff  [x] Walker Management/Safety  [] Adaptive Equipment as Needed     AFTER TREATMENT PRECAUTIONS: Bed/Chair Locked, Call light within reach, Chair, Needs within reach, RN notified, and Visitors at bedside    INTERDISCIPLINARY COLLABORATION:  RN/ PCT and OT/ PARKER    COMPLIANCE WITH PROGRAM/EXERCISE: Will assess as treatment progresses. RECOMMENDATIONS/INTENT FOR NEXT TREATMENT SESSION: Treatment next visit will focus on increasing Ms. Erickson Angle independence with bed mobility, transfers, gait training, strength/ROM exercises, modalities for pain, and patient education.      TIME IN/OUT:  Time In: 1336  Time Out: 1421  Minutes: Hamzah Johnson

## 2022-07-23 VITALS
TEMPERATURE: 97.7 F | BODY MASS INDEX: 31.1 KG/M2 | SYSTOLIC BLOOD PRESSURE: 135 MMHG | OXYGEN SATURATION: 97 % | HEART RATE: 67 BPM | WEIGHT: 175.5 LBS | DIASTOLIC BLOOD PRESSURE: 55 MMHG | HEIGHT: 63 IN | RESPIRATION RATE: 16 BRPM

## 2022-07-23 LAB
HCT VFR BLD AUTO: 28.6 % (ref 35.8–46.3)
HGB BLD-MCNC: 9.7 G/DL (ref 11.7–15.4)

## 2022-07-23 PROCEDURE — 6370000000 HC RX 637 (ALT 250 FOR IP): Performed by: PHYSICIAN ASSISTANT

## 2022-07-23 PROCEDURE — 97530 THERAPEUTIC ACTIVITIES: CPT

## 2022-07-23 PROCEDURE — 97535 SELF CARE MNGMENT TRAINING: CPT

## 2022-07-23 PROCEDURE — 6360000002 HC RX W HCPCS: Performed by: PHYSICIAN ASSISTANT

## 2022-07-23 PROCEDURE — 6370000000 HC RX 637 (ALT 250 FOR IP): Performed by: ORTHOPAEDIC SURGERY

## 2022-07-23 PROCEDURE — 36415 COLL VENOUS BLD VENIPUNCTURE: CPT

## 2022-07-23 PROCEDURE — 85018 HEMOGLOBIN: CPT

## 2022-07-23 RX ORDER — OXYCODONE HYDROCHLORIDE 5 MG/1
5 TABLET ORAL EVERY 4 HOURS PRN
Status: DISCONTINUED | OUTPATIENT
Start: 2022-07-23 | End: 2022-07-23 | Stop reason: HOSPADM

## 2022-07-23 RX ADMIN — ONDANSETRON 4 MG: 2 INJECTION INTRAMUSCULAR; INTRAVENOUS at 08:28

## 2022-07-23 RX ADMIN — OXYCODONE 5 MG: 5 TABLET ORAL at 09:29

## 2022-07-23 RX ADMIN — ASPIRIN 81 MG: 81 TABLET, COATED ORAL at 09:24

## 2022-07-23 RX ADMIN — LISINOPRIL AND HYDROCHLOROTHIAZIDE 1 TABLET: 12.5; 1 TABLET ORAL at 09:24

## 2022-07-23 RX ADMIN — LEVOTHYROXINE SODIUM 75 MCG: 0.07 TABLET ORAL at 06:46

## 2022-07-23 RX ADMIN — ACETAMINOPHEN 1000 MG: 500 TABLET, FILM COATED ORAL at 05:52

## 2022-07-23 RX ADMIN — SENNOSIDES AND DOCUSATE SODIUM 1 TABLET: 50; 8.6 TABLET ORAL at 09:24

## 2022-07-23 ASSESSMENT — PAIN SCALES - GENERAL
PAINLEVEL_OUTOF10: 3
PAINLEVEL_OUTOF10: 4
PAINLEVEL_OUTOF10: 3
PAINLEVEL_OUTOF10: 1

## 2022-07-23 ASSESSMENT — PAIN DESCRIPTION - ORIENTATION
ORIENTATION: RIGHT
ORIENTATION: RIGHT

## 2022-07-23 ASSESSMENT — PAIN DESCRIPTION - LOCATION
LOCATION: KNEE
LOCATION: KNEE

## 2022-07-23 ASSESSMENT — PAIN DESCRIPTION - DESCRIPTORS: DESCRIPTORS: ACHING

## 2022-07-23 NOTE — PROGRESS NOTES
PHYSICAL THERAPY JOINT CAMP: TOTAL KNEE ARTHROPLASTY Initial Assessment and PM  (Link to Caseload Tracking: PT Visit Days : 2  Acknowledge Orders  Time In/Out  PT Charge Capture  Rehab Caseload Tracker  Episode   Adarsh Zambrano is a 64 y.o. female   PRIMARY DIAGNOSIS: Arthritis of knee, right  Arthritis of knee, right [M17.11]  Unilateral primary osteoarthritis, right knee [M17.11]  Procedure(s) (LRB):  RIGHT KNEE TOTAL ARTHROPLASTY ROBOTIC   (Right)  1 Day Post-Op  Reason for Referral: Pain in Right Knee (M25.561)  Stiffness of Right Knee, Not elsewhere classified (M25.661)  Difficulty in walking, Not elsewhere classified (R26.2)  Outpatient in a bed: Payor[facility] [employer] / [facility] / Product Type: *No Product type* /     REHAB RECOMMENDATIONS:   Recommendation to date pending progress:  Setting:  Home Health Therapy    Equipment:    3 in 1 Bedside Commode  Rolling Walker     RANGE OF MOTION:   Right 103Knee Flexion: R Knee Flexion 0-145: 103  Right Knee Extension: R Knee Extension 0: 0     GAIT: I Mod I S SBA CGA Min Mod Max Total  NT x2 Comments:   Level of Assistance [] [] [x] [] [] [] [] [] [] [] []            Weightbearing Status  Right Lower Extremity Weight Bearing: Weight Bearing As Tolerated    Distance  200ft & 165ft     Gait Quality Antalgic, Decreased martha , Decreased step clearance, Decreased step length, and Decreased stance    DME Rolling Walker     Stairs Stairs/Curb  Stairs?: Yes  Stairs  # Steps : 2  Rails: None  Device:  (up & down front approach with cane & HHA minimal assist)  Assistance: Minimal assistance (backwards aproach)  Comment: up & down a single step with walker & CGA    Ramp NA    I=Independent, Mod I=Modified Independent, S=Supervision, SBA=Standby Assistance, CGA=Contact Guard Assistance,   Min=Minimal Assistance, Mod=Moderate Assistance, Max=Maximal Assistance, Total=Total Assistance, NT=Not Tested    ASSESSMENT:   ASSESSMENT:  Ms. Mike Birch showed increased gait distance & improved level of assist for bed mobility, transfers & gait. This pt was understanding of PT instructions & expectations. Pt is ready for the next phase of her rehab program.     Outcome Measure:   KOOS-JR:  Jr LOLI. Knee Survey Score: 7    SUBJECTIVE:   Ms. Brenda Hilton states, that she is ready to return home    Social/Functional Lives With: Spouse  Type of Home: House  Home Layout: One level  Home Access: Stairs to enter without rails (sunken den 1 step)  Entrance Stairs - Number of Steps: 2  Ambulation Assistance: Independent  Transfer Assistance: Independent    OBJECTIVE:     PAIN: VITAL SIGNS: LINES/DRAINS:   Pre Treatment:  Pain Assessment: 0-10  Pain Level: 3  Pain Location: Knee  Pain Orientation: Right  Non-Pharmaceutical Pain Intervention(s): Cold pack; Ambulation/Increased Activity      Post Treatment: 3/10 Vitals NT       Oxygen NT    IV    RESTRICTIONS/PRECAUTIONS:  Restrictions/Precautions: Weight Bearing, Fall Risk  Right Lower Extremity Weight Bearing: Weight Bearing As Tolerated                LOWER EXTREMITY GROSS EVALUATION:  RIGHT LE   Within Functional Limits   Abnormal   Comments   Strength [] [x]  Decreased but functional   Range of Motion [] [] AROM RLE (degrees)  R Knee Flexion 0-145: 103  R Knee Extension 0: 0      LEFT LE Within Functional Limits   Abnormal   Comments   Strength [x] []     Range of Motion [x] []       UPPER EXTREMITY GROSS EVALUATION:     Within Functional Limits   Abnormal   Comments   Strength [x] []    Range of Motion [x] []      SENSATION  Sensation [x]  grossly     COGNITION/  PERCEPTION: Intact Impaired (Comments):   Orientation [x]     Vision [x]     Hearing [x]     Cognition  [x]       MOBILITY: I Mod I S SBA CGA Min Mod Max Total  NT x2 Comments:   Bed Mobility    Rolling [x] [] [] [] [] [] [] [] [] [] []    Supine to Sit [x] [] [] [] [] [] [] [] [] [] []    Scooting [x] [] [] [] [] [] [] [] [] [] []    Sit to Supine [] [] [] [] [] [] [] [] [] [] [] Transfers    Sit to Stand [] [] [x] [] [] [] [] [] [] [] []    Bed to Chair [] [] [x] [] [] [] [] [] [] [] [] With walker   Stand to Sit [] [] [x]  [] [] [] [] [] [] []    Stand Pivot [] [] [x] [] [] [] [] [] [] [] []    Toilet [] [] [x] [] [] [] [] [] [] [] []     [] [] [] [] [] [] [] [] [] [] []    I=Independent, Mod I=Modified Independent, S=Supervision, SBA=Standby Assistance, CGA=Contact Guard Assistance,   Min=Minimal Assistance, Mod=Moderate Assistance, Max=Maximal Assistance, Total=Total Assistance, NT=Not Tested    BALANCE: Good Fair+ Fair Fair- Poor NT Comments   Sitting Static [] [] [x] [] [] []    Sitting Dynamic [] [] [x] [] [] []              Standing Static [] [] [x] [] [] [] With walker    Standing Dynamic [] [] [x] [] [] [] With walker     PLAN:   ACUTE PHYSICAL THERAPY GOALS:   (Developed with and agreed upon by patient and/or caregiver.)  GOALS (1-4 days):  (1.)Ms. Efrain Boone will move from supine to sit and sit to supine  in bed with INDEPENDENT. Met 7/23  (2.)Ms. Efrain Boone will transfer from bed to chair and chair to bed with SUPERVISION using the least restrictive device. Met 7/23  (3.)Ms. Efrain Boone will ambulate with SUPERVISION for 300 feet with the least restrictive device. (4.)Ms. Efrain Boone will ambulate up/down 2 steps with no railing with MINIMAL ASSIST using  device PRN , also pt able to go up & down 1 step with walker & SBA. Met 7/23  (5.)Ms. Efrain Boone will increase right knee ROM to 0-90°.  Met 7/22  ________________________________________________________________________________________________           FREQUENCY AND DURATION: BID for duration of hospital stay or until stated goals are met, whichever comes first.    THERAPY PROGNOSIS: Good    PROBLEM LIST:   (Skilled intervention is medically necessary to address:)  Decreased ADL/Functional Activities  Decreased Activity Tolerance  Decreased AROM/PROM  Decreased Balance  Decreased Coordination  Decreased Gait Ability  Decreased Safety Awareness  Decreased Strength  Decreased Transfer Abilities  Increased Pain   INTERVENTIONS PLANNED:   (Benefits and precautions of physical therapy have been discussed with the patient.)  Therapeutic Activity  Therapeutic Exercise/HEP  Gait Training  Education       TREATMENT:   EVALUATION: LOW COMPLEXITY: (Untimed Charge)    TREATMENT:   Therapeutic Activity (60 Minutes): Therapeutic activity included TKA exercises, reviewed PT expectations for DC, reviewed diagonal wt shift to reduce risks of blood clots for prep to wean off walker, bathroom transfer & progressive gait training x 2 attempts & multiple stair training attempts ( X 8 )  with daughter & spouse practicing guarding technique to improve functional Activity tolerance, Balance, Coordination, Mobility, Strength, and ROM.     TREATMENT GRID:  THERAPEUTIC  EXERCISES: DATE:  7/22 DATE:  7/23 DATE:      AM PM AM PM AM PM    [] [] [] [] [] []   Ankle Pumps  20 20      Quad Sets  20 20      Gluteal Sets  20 20      Hip Abd/ADduction  20 20      Straight Leg Raises  20 20      Knee Slides  20 20      Short Arc Quads  20 20      Chair Slides  20 20                        B = bilateral; AA = active assistive; A = active; P = passive      EDUCATION: Education Given To: Patient, Family, Caregiver  Education Provided: Precautions, Home Exercise Program, Transfer Training, IADL Safety  Education Method: Verbal, Teach Back, Printed Information/Hand-outs, Demonstration  Education Outcome: Verbalized understanding, Demonstrated understanding  EDUCATION:  [x] Home Exercises  [x] Fall Precautions  [x] No Pillow Under Knee  [x] D/C Instruction Review [x] Cryocuff  [x] Walker Management/Safety  [] Adaptive Equipment as Needed     AFTER TREATMENT PRECAUTIONS: Bed/Chair Locked, Call light within reach, Chair, Needs within reach, RN notified, and Visitors at bedside    INTERDISCIPLINARY COLLABORATION:  RN/ PCT    COMPLIANCE WITH PROGRAM/EXERCISE: Will assess as treatment progresses. RECOMMENDATIONS/INTENT FOR NEXT TREATMENT SESSION: Treatment next visit will focus on increasing Ms. Scot Bears independence with bed mobility, transfers, gait training, strength/ROM exercises, modalities for pain, and patient education. TIME IN/OUT:  Time In: 3696  Time Out: 12  Minutes: 55 Rafi Perez

## 2022-07-23 NOTE — PROGRESS NOTES
OCCUPATIONAL THERAPY Daily Note, Discharge, and AM      (Link to Caseload Tracking: OT Visit Days: 2  OT Orders   Time  OT Charge Capture  Rehab Caseload Tracker  Episode     Anatoliy Squires is a 64 y.o. female   PRIMARY DIAGNOSIS: Arthritis of knee, right  Arthritis of knee, right [M17.11]  Unilateral primary osteoarthritis, right knee [M17.11]  Procedure(s) (LRB):  RIGHT KNEE TOTAL ARTHROPLASTY ROBOTIC   (Right)  1 Day Post-Op  Reason for Referral: Pain in Right Knee (M25.561)  Stiffness of Right Knee, Not elsewhere classified (M25.661)  Outpatient in a bed: Payor: [employer] MEDICARE / Plan: [place] [employer] / Product Type: *No Product type* /     ASSESSMENT:     REHAB RECOMMENDATIONS:   Recommendation to date pending progress:  Setting:  Home Health Therapy    Equipment:    3 in 1 Bedside Commode  Rolling Walker     ASSESSMENT:  Ms. Raf Cobos is s/p right TKA and presents with decreased independence with functional mobility and activities of daily living as compared to baseline level of function and safety. Patient would benefit from skilled Occupational Therapy to maximize independence and safety with self-care task and functional mobility. Patient able to completed self care task lower body dressing, upper body dressing, toileting, grooming, and functional mobility  at bathroom with minimal assist assist.  Mobilized from hospital bed to bathroom using a rolling walker with assist. Patient is hopeful to spend the night to better prepare for safe discharge home. Pt is moving well. Pt up in room, to bathroom for toileting donned clothes and groomed at sink. Pt ambulated in espinosa household distance. Pt to recliner with ice applied and all needs in reach, family present. 7/23/22: Ms. Raf Cobos was able to perform toileting, grooming, bathing, and dressing with assistance as charted below. She had been nauseous throughout the morning and RN had been by to administer medication.  She performed functional household mobility and transfers with RW and SBA. She plans to have support from daughter and spouse once home. Should progress well with continued therapy upon discharge. Discharge acute OT as all goals have been met. [facility] [address] [id number] AM-PAC 6 Clicks Daily Activity Inpatient Short Form:    AM-PAC Daily Activity Inpatient   How much help for putting on and taking off regular lower body clothing?: A Little  How much help for Bathing?: None  How much help for Toileting?: None  How much help for putting on and taking off regular upper body clothing?: None  How much help for taking care of personal grooming?: None  How much help for eating meals?: None  AM-PAC Inpatient Daily Activity Raw Score: 23  AM-PAC Inpatient ADL T-Scale Score : 51.12  ADL Inpatient CMS 0-100% Score: 15.86  ADL Inpatient CMS G-Code Modifier : CI     SUBJECTIVE:     Ms. Ever Mcghee states she is planning on going home today       Social/Functional   Lives With: Spouse  Type of Home: House  Home Layout: One level  Home Access: Stairs to enter without rails (sunken den 1 step)  Entrance Stairs - Number of Steps: 2  Ambulation Assistance: Independent  Transfer Assistance: Independent    OBJECTIVE:     Noelle Maynard / Abraham Rodríguez / Paco Pedro: None    RESTRICTIONS/PRECAUTIONS:  Restrictions/Precautions: Weight Bearing, Fall Risk  Right Lower Extremity Weight Bearing: Weight Bearing As Tolerated    PAIN: VITALS / O2:   Pre Treatment:          Post Treatment: pt without complaint of pain Vitals          Oxygen        GROSS EVALUATION: INTACT IMPAIRED   (See Comments)   UE AROM [x] []   UE PROM [x] []   Strength [x]       Posture / Balance []  Slightly decreased standing balance.     Sensation [x]     Coordination [x]       Tone [x]       Edema []    Activity Tolerance [x]       Hand Dominance R [] L []      COGNITION/  PERCEPTION: INTACT IMPAIRED   (See Comments)   Orientation [x]     Vision [x]     Hearing [x]     Cognition  [x]     Perception [x]       MOBILITY: I Mod I S SBA CGA Min Mod Max Total  NT x2 Comments:   Bed Mobility    Rolling [] [] [] [] [] [] [] [] [] [] []    Supine to Sit [] [] [] [] [] [] [] [] [] [] []    Scooting [] [] [] [] [] [] [] [] [] [] []    Sit to Supine [] [] [] [] [] [] [] [] [] [] []    Transfers    Sit to Stand [] [] [] [x] [] [] [] [] [] [] []    Bed to Chair [] [] [] [] [] [] [] [] [] [] []    Stand to Sit [] [] [] [x] [] [] [] [] [] [] []    Tub/Shower [] [] [] [x] [] [] [] [] [] [] []       Toilet [] [] [] [x] [] [] [] [] [] [] []        [] [] [] [] [] [] [] [] [] [] []    I=Independent, Mod I=Modified Independent, S=Supervision/Setup, SBA=Standby Assistance, CGA=Contact Guard Assistance, Min=Minimal Assistance, Mod=Moderate Assistance, Max=Maximal Assistance, Total=Total Assistance, NT=Not Tested    ACTIVITIES OF DAILY LIVING: I Mod I S SBA CGA Min Mod Max Total NT Comments   BASIC ADLs:              Upper Body Bathing [] [] [x] [] [] [] [] [] [] []    Lower Body Bathing [] [] [x] [] [] [] [] [] [] []    Toileting [] [] [x] [] [] [] [] [] [] []    Upper Body Dressing [] [] [x] [] [] [] [] [] [] []    Lower Body Dressing [] [] [] [x] [x] [] [] [] [] []    Feeding [x] [] [] [] [] [] [] [] [] []    Grooming [] [] [x] [] [] [] [] [] [] []    Personal Device Care [] [] [] [] [] [] [] [] [] []    Functional Mobility [] [] [] [x] [] [] [] [] [] []    I=Independent, Mod I=Modified Independent, S=Supervision/Setup, SBA=Standby Assistance, CGA=Contact Guard Assistance, Min=Minimal Assistance, Mod=Moderate Assistance, Max=Maximal Assistance, Total=Total Assistance, NT=Not Tested    PLAN:     FREQUENCY/DURATION     for duration of hospital stay or until stated goals are met, whichever comes first.    ACUTE OCCUPATIONAL THERAPY GOALS:   (Developed with and agreed upon by patient and/or caregiver.)    GOALS:   DISCHARGE GOALS (in preparation for going home/rehab):  3 days  1.   Ms. Brenda Fleming will perform lower body dressing activity with stand by assist required to demonstrate improved functional mobility and safety. -GOAL MET 7/23/22    2. Ms. Mike Birch will perform bathing activity with stand by assist required to demonstrate improved functional mobility and safety. GOAL MET 7/23/2022   3. Ms. Mike Birch will perform toileting activity with  stand by assist to demonstrate improved functional mobility and safety. -GOAL MET 7/23/22    4. Ms. Mike Birch will perform all functional transfers transfer with stand by assist to demonstrate improved functional mobility and safety. -GOAL MET 7/23/22        PROBLEM LIST:   (Skilled intervention is medically necessary to address:)  Decreased ADL/Functional Activities  Decreased Balance  Increased Pain   INTERVENTIONS PLANNED:   (Benefits and precautions of occupational therapy have been discussed with the patient.)  Self Care Training  Education         TREATMENT:     TREATMENT:   Self Care (49 minutes): Patient participated in upper body bathing, lower body bathing, toileting, upper body dressing, lower body dressing, and grooming ADLs in unsupported sitting and standing with minimal verbal and tactile cueing to increase independence, increase activity tolerance, and increase safety awareness. Patient also participated in functional mobility, functional transfer, energy conservation, and adaptive equipment training to increase independence, increase activity tolerance, and increase safety awareness.         AFTER TREATMENT PRECAUTIONS: Bed/Chair Locked, Call light within reach, Chair, Needs within reach, and Visitors at bedside    INTERDISCIPLINARY COLLABORATION:  RN/ PCT, PT/ PTA, and OT/ PARKER    EDUCATION:  Education Given To: Patient, Family  Education Provided: Role of Therapy, Plan of Care, Precautions, ADL Adaptive Strategies, Transfer Training, Energy Conservation, Family Education, Equipment, Fall Prevention Strategies  Education Method: Demonstration, Verbal  Barriers to Learning: None  Education Outcome: Verbalized understanding, Demonstrated understanding  [x] Safe And Effective Hygiene  [x] Fall Precautions  [] Hip Precautions  [x] D/C Instruction Review [] Prosthesis Review  [x] Walker Management/Safety  [x] Adaptive Equipment as Needed  [x] Therapeutic Resting Position of Joint       TOTAL TREATMENT DURATION AND TIME:  Time In: 0935  Time Out: 1024  Minutes: [address] [place], [place]

## 2022-07-23 NOTE — PROGRESS NOTES
Orthopedic Joint Progress Note    2022  Admit Date: 2022  Admit Diagnosis: Arthritis of knee, right [M17.11]  Unilateral primary osteoarthritis, right knee [M17.11]    1 Day Post-Op    Subjective: doing well ready to go home     Dat Mcgregor     Review of Systems: pertinent items are noted in HPI    Objective:     PT/OT:     PATIENT MOBILITY                           Vital Signs:    Blood pressure (!) 135/55, pulse 67, temperature 97.7 °F (36.5 °C), temperature source Oral, resp. rate 16, height 5' 3\" (1.6 m), weight 175 lb 8 oz (79.6 kg), SpO2 97 %.   Temp (24hrs), Av.7 °F (36.5 °C), Min:97 °F (36.1 °C), Max:98.2 °F (36.8 °C)      Pain Control:        Meds:  Current Facility-Administered Medications   Medication Dose Route Frequency    atorvastatin (LIPITOR) tablet 10 mg  10 mg Oral Once per day on     cyclobenzaprine (FLEXERIL) tablet 10 mg  10 mg Oral BID PRN    ferrous sulfate (SLOW FE) extended release tablet 142 mg (Patient Supplied)  142 mg Oral Daily    levothyroxine (SYNTHROID) tablet 75 mcg  75 mcg Oral Daily    lisinopril-hydroCHLOROthiazide (PRINZIDE;ZESTORETIC) 10-12.5 MG per tablet 1 tablet  1 tablet Oral Daily    sodium chloride flush 0.9 % injection 5-40 mL  5-40 mL IntraVENous 2 times per day    sodium chloride flush 0.9 % injection 5-40 mL  5-40 mL IntraVENous PRN    0.9 % sodium chloride infusion   IntraVENous PRN    acetaminophen (TYLENOL) tablet 1,000 mg  1,000 mg Oral Q6H    oxyCODONE (ROXICODONE) immediate release tablet 10 mg  10 mg Oral Q4H PRN    HYDROmorphone HCl PF (DILAUDID) injection 1 mg  1 mg IntraVENous Q3H PRN    ondansetron (ZOFRAN-ODT) disintegrating tablet 4 mg  4 mg Oral Q8H PRN    Or    ondansetron (ZOFRAN) injection 4 mg  4 mg IntraVENous Q6H PRN    sennosides-docusate sodium (SENOKOT-S) 8.6-50 MG tablet 1 tablet  1 tablet Oral BID    aspirin EC tablet 81 mg  81 mg Oral BID    diphenhydrAMINE (BENADRYL) capsule 25 mg  25 mg Oral Q6H PRN    Or

## 2022-07-24 ENCOUNTER — HOME CARE VISIT (OUTPATIENT)
Dept: SCHEDULING | Facility: HOME HEALTH | Age: 77
End: 2022-07-24
Payer: MEDICARE

## 2022-07-24 VITALS
SYSTOLIC BLOOD PRESSURE: 134 MMHG | HEART RATE: 74 BPM | RESPIRATION RATE: 16 BRPM | OXYGEN SATURATION: 98 % | DIASTOLIC BLOOD PRESSURE: 68 MMHG | TEMPERATURE: 97 F

## 2022-07-24 PROCEDURE — 400013 HH SOC

## 2022-07-24 PROCEDURE — G0151 HHCP-SERV OF PT,EA 15 MIN: HCPCS

## 2022-07-24 PROCEDURE — 1090000001 HH PPS REVENUE CREDIT

## 2022-07-24 PROCEDURE — 0221000100 HH NO PAY CLAIM PROCEDURE

## 2022-07-24 PROCEDURE — 1090000002 HH PPS REVENUE DEBIT

## 2022-07-24 ASSESSMENT — ENCOUNTER SYMPTOMS
DYSPNEA ACTIVITY LEVEL: AFTER AMBULATING MORE THAN 20 FT
CONSTIPATION: 1
PAIN LOCATION - PAIN QUALITY: ACHE

## 2022-07-25 PROCEDURE — 1090000002 HH PPS REVENUE DEBIT

## 2022-07-25 PROCEDURE — 1090000001 HH PPS REVENUE CREDIT

## 2022-07-26 ENCOUNTER — HOME CARE VISIT (OUTPATIENT)
Dept: SCHEDULING | Facility: HOME HEALTH | Age: 77
End: 2022-07-26
Payer: MEDICARE

## 2022-07-26 VITALS
SYSTOLIC BLOOD PRESSURE: 120 MMHG | HEART RATE: 78 BPM | DIASTOLIC BLOOD PRESSURE: 70 MMHG | RESPIRATION RATE: 17 BRPM | OXYGEN SATURATION: 98 % | TEMPERATURE: 98 F

## 2022-07-26 PROCEDURE — 1090000002 HH PPS REVENUE DEBIT

## 2022-07-26 PROCEDURE — G0157 HHC PT ASSISTANT EA 15: HCPCS

## 2022-07-26 PROCEDURE — 1090000001 HH PPS REVENUE CREDIT

## 2022-07-26 ASSESSMENT — ENCOUNTER SYMPTOMS: PAIN LOCATION - PAIN QUALITY: ACHING

## 2022-07-27 PROCEDURE — 1090000001 HH PPS REVENUE CREDIT

## 2022-07-27 PROCEDURE — 1090000002 HH PPS REVENUE DEBIT

## 2022-07-28 ENCOUNTER — HOME CARE VISIT (OUTPATIENT)
Dept: SCHEDULING | Facility: HOME HEALTH | Age: 77
End: 2022-07-28
Payer: MEDICARE

## 2022-07-28 VITALS
TEMPERATURE: 98.4 F | SYSTOLIC BLOOD PRESSURE: 120 MMHG | HEART RATE: 82 BPM | OXYGEN SATURATION: 96 % | DIASTOLIC BLOOD PRESSURE: 80 MMHG | RESPIRATION RATE: 17 BRPM

## 2022-07-28 PROCEDURE — 1090000002 HH PPS REVENUE DEBIT

## 2022-07-28 PROCEDURE — 1090000001 HH PPS REVENUE CREDIT

## 2022-07-28 PROCEDURE — G0157 HHC PT ASSISTANT EA 15: HCPCS

## 2022-07-28 ASSESSMENT — ENCOUNTER SYMPTOMS: PAIN LOCATION - PAIN QUALITY: ACHING

## 2022-07-29 PROCEDURE — 1090000001 HH PPS REVENUE CREDIT

## 2022-07-29 PROCEDURE — 1090000002 HH PPS REVENUE DEBIT

## 2022-07-30 PROCEDURE — 1090000001 HH PPS REVENUE CREDIT

## 2022-07-30 PROCEDURE — 1090000002 HH PPS REVENUE DEBIT

## 2022-07-31 PROCEDURE — 1090000001 HH PPS REVENUE CREDIT

## 2022-07-31 PROCEDURE — 1090000002 HH PPS REVENUE DEBIT

## 2022-08-01 PROCEDURE — 1090000002 HH PPS REVENUE DEBIT

## 2022-08-01 PROCEDURE — 1090000001 HH PPS REVENUE CREDIT

## 2022-08-02 ENCOUNTER — HOME CARE VISIT (OUTPATIENT)
Dept: SCHEDULING | Facility: HOME HEALTH | Age: 77
End: 2022-08-02
Payer: MEDICARE

## 2022-08-02 VITALS
TEMPERATURE: 98.2 F | RESPIRATION RATE: 17 BRPM | SYSTOLIC BLOOD PRESSURE: 120 MMHG | OXYGEN SATURATION: 98 % | HEART RATE: 80 BPM | DIASTOLIC BLOOD PRESSURE: 70 MMHG

## 2022-08-02 PROCEDURE — 1090000002 HH PPS REVENUE DEBIT

## 2022-08-02 PROCEDURE — G0157 HHC PT ASSISTANT EA 15: HCPCS

## 2022-08-02 PROCEDURE — 1090000001 HH PPS REVENUE CREDIT

## 2022-08-02 ASSESSMENT — ENCOUNTER SYMPTOMS: PAIN LOCATION - PAIN QUALITY: ACHING

## 2022-08-03 PROCEDURE — 1090000002 HH PPS REVENUE DEBIT

## 2022-08-03 PROCEDURE — 1090000001 HH PPS REVENUE CREDIT

## 2022-08-04 ENCOUNTER — HOME CARE VISIT (OUTPATIENT)
Dept: HOME HEALTH SERVICES | Facility: HOME HEALTH | Age: 77
End: 2022-08-04
Payer: MEDICARE

## 2022-08-04 PROCEDURE — 1090000002 HH PPS REVENUE DEBIT

## 2022-08-04 PROCEDURE — 1090000001 HH PPS REVENUE CREDIT

## 2022-08-05 PROCEDURE — 1090000002 HH PPS REVENUE DEBIT

## 2022-08-05 PROCEDURE — 1090000001 HH PPS REVENUE CREDIT

## 2022-08-06 PROCEDURE — 1090000002 HH PPS REVENUE DEBIT

## 2022-08-06 PROCEDURE — 1090000001 HH PPS REVENUE CREDIT

## 2022-08-07 PROCEDURE — 1090000002 HH PPS REVENUE DEBIT

## 2022-08-07 PROCEDURE — 1090000001 HH PPS REVENUE CREDIT

## 2022-08-08 PROCEDURE — 1090000002 HH PPS REVENUE DEBIT

## 2022-08-08 PROCEDURE — 1090000001 HH PPS REVENUE CREDIT

## 2022-08-09 ENCOUNTER — HOME CARE VISIT (OUTPATIENT)
Dept: SCHEDULING | Facility: HOME HEALTH | Age: 77
End: 2022-08-09
Payer: MEDICARE

## 2022-08-09 VITALS
DIASTOLIC BLOOD PRESSURE: 64 MMHG | OXYGEN SATURATION: 98 % | HEART RATE: 72 BPM | TEMPERATURE: 98.1 F | SYSTOLIC BLOOD PRESSURE: 118 MMHG | RESPIRATION RATE: 17 BRPM

## 2022-08-09 DIAGNOSIS — Z96.651 S/P TOTAL KNEE ARTHROPLASTY, RIGHT: Primary | ICD-10-CM

## 2022-08-09 PROCEDURE — 1090000002 HH PPS REVENUE DEBIT

## 2022-08-09 PROCEDURE — G0157 HHC PT ASSISTANT EA 15: HCPCS

## 2022-08-09 PROCEDURE — 1090000001 HH PPS REVENUE CREDIT

## 2022-08-10 PROCEDURE — 1090000002 HH PPS REVENUE DEBIT

## 2022-08-10 PROCEDURE — 1090000001 HH PPS REVENUE CREDIT

## 2022-08-11 ENCOUNTER — HOME CARE VISIT (OUTPATIENT)
Dept: SCHEDULING | Facility: HOME HEALTH | Age: 77
End: 2022-08-11
Payer: MEDICARE

## 2022-08-11 VITALS
TEMPERATURE: 98.4 F | DIASTOLIC BLOOD PRESSURE: 72 MMHG | HEART RATE: 72 BPM | SYSTOLIC BLOOD PRESSURE: 128 MMHG | OXYGEN SATURATION: 98 % | RESPIRATION RATE: 18 BRPM

## 2022-08-11 PROCEDURE — 1090000002 HH PPS REVENUE DEBIT

## 2022-08-11 PROCEDURE — G0151 HHCP-SERV OF PT,EA 15 MIN: HCPCS

## 2022-08-11 PROCEDURE — 1090000001 HH PPS REVENUE CREDIT

## 2022-08-11 ASSESSMENT — ENCOUNTER SYMPTOMS: PAIN LOCATION - PAIN QUALITY: SORE

## 2022-08-15 ENCOUNTER — HOSPITAL ENCOUNTER (OUTPATIENT)
Dept: PHYSICAL THERAPY | Age: 77
Setting detail: RECURRING SERIES
Discharge: HOME | End: 2022-08-18
Payer: MEDICARE

## 2022-08-15 PROCEDURE — 97161 PT EVAL LOW COMPLEX 20 MIN: CPT

## 2022-08-15 ASSESSMENT — PAIN SCALES - GENERAL: PAINLEVEL_OUTOF10: 3

## 2022-08-15 ASSESSMENT — PAIN DESCRIPTION - LOCATION: LOCATION: KNEE

## 2022-08-15 ASSESSMENT — PAIN DESCRIPTION - ORIENTATION: ORIENTATION: RIGHT

## 2022-08-15 NOTE — PROGRESS NOTES
Alka Brian  : 1945  Primary: [facility]  Secondary:  Marce Elizabeth @ [address] [place] [address] Way [address]  Phone: [phone]  Fax: [phone] Plan Frequency: up to 3 x's per week until goals met, discharge or reassessment  Plan of Care/Certification Expiration Date: 22      PT Visit Info: Total # of Visits Approved: 1  Total # of Visits to Date: 1  PT Insurance Information: [employer] HMO  Progress Note Counter: 1     Visit Count:  1   OUTPATIENT PHYSICAL THERAPY:OP NOTE TYPE: Treatment Note 8/15/2022       Episode  }Appt Desk             Treatment Diagnosis:  Pain in Right Knee (M25.561)  Stiffness of Right Knee, Not elsewhere classified (X46.634)  Medical/Referring Diagnosis:  Presence of right artificial knee joint [J01.509]  Referring Physician:  Sumi Lemus MD MD Orders:  PT Eval and Treat   Date of Onset:  Onset Date: 22     Allergies:   Ciprofloxacin and Ak-mycin [erythromycin]  Restrictions/Precautions:  Restrictions/Precautions: Weight Bearing; Fall Risk  Right Lower Extremity Weight Bearing: Weight Bearing As Tolerated     Interventions Planned (Treatment may consist of any combination of the following):    Current Treatment Recommendations: Strengthening; ROM; Balance training; Functional mobility training; Transfer training; ADL/Self-care training; Therapeutic activities; Positioning; Modalities; Home exercise program; Safety education & training   Subjective Comments:     Initial:}Right Knee 3/10Post Session:  Right  Knee 3/10  Medications Last Reviewed:  8/15/2022  Updated Objective Findings:  See evaluation note from 8/15/22  Treatment   Eval only today    Date:   Date:   Date:     Activity/Exercise Parameters Parameters Parameters                                               Access Code: [email or web address]  URL: [email or web address]  Date: 08/15/2022  Prepared by:  Amanda Bryant Sitting Quad Set with Towel Roll Under Heel - 2 x daily - 7 x weekly - 1 sets - 1 reps - up to 15 minutes hold  Supine Knee to Chest with Leg Straight - 2 x daily - 7 x weekly - 1 sets - 1 reps - 15 minutes hold  Standing Quad Set - 3 x daily - 5 x weekly - 3 sets - 10 reps - 5 hold  Standing Gluteal Sets - 3 x daily - 5 x weekly - 3 sets - 10 reps - 5 hold  Standing Hip Flexion AROM - 1 x daily - 5-7 x weekly - 1-3 sets - 10 reps - 3 seconds hold  Standing Hip Abduction - 1 x daily - 5-7 x weekly - 1-3 sets - 10 reps - 3 seconds hold  Standing Hip Extension - 1 x daily - 5-7 x weekly - 1-3 sets - 10 reps - 3 seconds hold  Sit to Stand - 1 x daily - 5 x weekly - 1 sets - 3 reps - up to 30 seconds hold  Walking March - 3 x daily - 4 reps  Sideways Walking - 3 x daily - 4 reps  Backwards Walking - 3 x daily - 4 reps    Treatment/Session Summary:    Treatment Assessment:  eval only today  Communication/Consultation:   HEP placed in chart  Equipment provided today:  regarding condition    Recommendations/Intent for next treatment session: Next visit will focus on s/p right knee TKA. .    Total Treatment Billable Duration:  0 minutes  Time In: 0930  Time Out: Tremaine Castellanos 85, PT       Charge Capture  }Post Session Pain  PT Visit [id number] Portal  MD Guidelines  Scanned Media  Benefits  MyChart    Future Appointments   Date Time Provider Carla Aleshia   8/16/2022  2:00 PM Kinjal Rogers MD [facility]

## 2022-08-15 NOTE — THERAPY EVALUATION
Nancy Beal  : 1945  Primary: [facility]  Secondary:  [address] @ [address] [address]  Phone: [phone]  Fax: [phone] Plan Frequency: up to 3 x's per week until goals met, discharge or reassessment  Plan of Care/Certification Expiration Date: 22    PT Visit Info: Total # of Visits Approved: 1  Total # of Visits to Date: 1  PT Insurance Information: [employer] HMO  Progress Note Counter: 1    Visit Count:  1    OUTPATIENT PHYSICAL THERAPY:OP NOTE TYPE: Initial Assessment 8/15/2022               Episode  Appt Desk         Treatment Diagnosis:  Pain in Right Hip (M25.551)  Stiffness of Right Hip, Not elsewhere classified (M25.651)  Medical/Referring Diagnosis:  Presence of right artificial knee joint [V49.935]  Referring Physician:  Lady La MD MD Orders:  PT Eval and Treat   Return MD Appt:    Future Appointments   Date Time Provider Carla Aleshia   2022  2:00 PM Leighton Shelton MD [facility]       Date of Onset:  Onset Date: 22   Allergies:  Ciprofloxacin and Ak-mycin [erythromycin]  Restrictions/Precautions:    Restrictions/Precautions: Weight Bearing; Fall Risk  Right Lower Extremity Weight Bearing: Weight Bearing As Tolerated     Medications Last Reviewed:  8/15/2022     SUBJECTIVE   History of Injury/Illness (Reason for Referral):  Right Knee :  Improving lacking end range flexion and extension only with some post operative weakness of the quad and gluteal region. PER DR. Irizarry Degree 22   Pre-operative Diagnosis:  Arthritis of knee, right [M17.11]  Post-operative Diagnosis: Arthritis of knee, right [M17.11]  Location: [address] [place] [address]  Surgeon: Erich Barry MD  Assistant: Iván Galvan     Anesthesia: General and ACB     Indications: Patient has end stage arthritis. They have tried and failed conservative management.      Procedure:Procedure(s) (LRB):  RIGHT KNEE TOTAL ARTHROPLASTY ROBOTIC   (Right)            CPT- 33860- Total knee arthroplasty           90966- Other procedures on musculoskeletal system           0055T- Computer assisted surgical navigation  Patient Stated Goal(s):  \"I am feeling great. \"  Initial: Right Knee 3/10 Post Session: Right Knee 3/10  Past Medical History/Comorbidities:   Ms. Damion Alvarez  has a past medical history of Arthritis, Hypertension, and Kidney stone. Ms. Damion Alvarez  has a past surgical history that includes Esophagogastroduodenoscopy; joint replacement (Left, 06/05/2012); and Total knee arthroplasty (Right, 7/22/2022). Social History/Living Environment:   Lives With: Spouse  Type of Home: House  Home Layout: One level  Home Access: Stairs to enter without rails (sunken den 1 step)  Entrance Stairs - Number of Steps: 2  Bathroom Shower/Tub: Walk-in shower  Bathroom Toilet: Standard     Prior Level of Function/Work/Activity:   Occupation: Retired  ADL Assistance: Independent  Homemaking Assistance: Independent  Ambulation Assistance: Independent  Transfer Assistance: Independent  No data recorded   Learning:   Does the patient/guardian have any barriers to learning?: No barriers  Will there be a co-learner?: Yes  Is an  required?: No  How does the patient/guardian prefer to learn new concepts?: Demonstration   Fall Risk Scale: Total Score: 35  Fatima Fall Risk: Medium (25-44)     Dominant Side:  right handed  Personal Factors:        Sex:  female        Age:  64 y.o. OBJECTIVE   PALPATION Butterfly strips still intact (will ask returning to MD on 8/16/22) , palpably tender right knee incision 3 weeks post op, closes and healing nicely. Diffuse edema to be expected.           Observation/Orthostatic Postural Assessment:   Exceptions to WDLRounded shoulders, Trunk flexion, ROM/Flexibility: Bilateral knee   Gross Assessment: Yes  AROM: Generally decreased, functional (left LE)                        RLE Assessment  RLE Assessment (WDL): Exceptions to WDL  RLE AROM / PROM   R Knee Flexion: 110  /  115  R Knee Extension: 3 /    0   Strength:   Gross Assessment: Yes  Strength: Generally decreased, functional               RLE Strength  R Knee Flexion: 3+  R Knee Extension: 3+  HIP EXTENSION :   3+   HIP ABDUCTION : 3+    Functional Mobility:    Gross Assessment: Yes      Gait Description (WDL): Exceptions to WDL  Stand to Sit: independent, Additional time  Sit to Stand: independent, Additional time  Distance (ft): community distance   Ambulation - Level of Assistance:  independent ; Additional time  Assistive Device: none   Speed/Rena: Delayed  Step Length: Left shortened;Right shortened  Stance: Right decreased  Gait Abnormalities: Antalgic;Trunk sway increased     ASSESSMENT   Initial Assessment: Lauren Marie presents with decreased strength and range of motion and with increased pain at times s/p right TKA with guarding at times but expected per protocol and ahead of schedule. She will benefit from skilled PT interventions to maximize independence with functional mobility and TKA exercises. Pt did well with evaluation and should progress well with skilled PT for s/p right TKA. See objective measurements below for specific measurements. Problem List: (Impacting functional limitations): Body Structures, Functions, Activity Limitations Requiring Skilled Therapeutic Intervention: Decreased functional mobility ; Decreased ADL status; Decreased ROM; Decreased body mechanics; Decreased tolerance to work activity;  Decreased strength; Decreased safe awareness; Decreased endurance; Decreased sensation; Decreased balance; Decreased coordination; Decreased posture   Therapy Prognosis:   Therapy Prognosis: Excellent   Assessment Complexity:   Low Complexity  PLAN   Effective Dates: 8/15/22  TO Plan of Care/Certification Expiration Date: 11/13/22   Frequency/Duration: Plan Frequency: up to 3 x's per week until goals met, discharge or reassessment   Interventions Planned (Treatment may consist of any combination of the following):    Current Treatment Recommendations: Strengthening; ROM; Balance training; Functional mobility training; Transfer training; ADL/Self-care training; Therapeutic activities; Positioning; Modalities; Home exercise program; Safety education & training   Goals: (Goals have been discussed and agreed upon with patient.)  DISCHARGE GOALS: Time Frame: 12 weeks                                                                                                                                       1.  Pt will be compliant and independent with advanced HEP in order to increase post TKA mobility and strength of the right LE. 2. Pt will increase score on the LEFS to 40/50 in order to demonstrate improved functional mobility and quality of life. 3. Pt will report standing for > 60 minutes with minimal to no increase in pain in order to be able to stand for prolonged periods as needed to perform standing for job. 4. Patient to demonstrate increased strength in bilateral LE's 1/2 muscle grade. Outcome Measure: Tool Used: Lower Extremity Functional Scale (LEFS)  Score:  Initial: 53/80 Most Recent: X/80 (Date: -- )   Interpretation of Score: 20 questions each scored on a 5 point scale with 0 representing \"extreme difficulty or unable to perform\" and 4 representing \"no difficulty\". The lower the score, the greater the functional disability. 80/80 represents no disability. Minimal detectable change is 9 points. Medical Necessity:   > Skilled intervention continues to be required due to right knee mobility s/p TKA, 3 weeks. .  Reason For Services/Other Comments:  > Patient continues to require skilled intervention due to right knee mobility and strengthening.  .  Total Duration:  Time In: 0930  Time Out: 1000    Regarding Tyraer Crisujataer therapy, I certify that the treatment plan above will be carried out by a therapist or under their direction. Thank you for this referral,  Lashonda Flores, PT     Referring Physician Signature: Rajani Altman., * No Signature is Required for this note.         Post Session Pain  Charge Capture  PT Visit Info MD Guidelines  MyChart

## 2022-08-16 ENCOUNTER — OFFICE VISIT (OUTPATIENT)
Dept: ORTHOPEDIC SURGERY | Age: 77
End: 2022-08-16

## 2022-08-16 DIAGNOSIS — Z96.651 STATUS POST TOTAL KNEE REPLACEMENT, RIGHT: Primary | ICD-10-CM

## 2022-08-16 PROCEDURE — 99024 POSTOP FOLLOW-UP VISIT: CPT | Performed by: ORTHOPAEDIC SURGERY

## 2022-08-16 NOTE — PROGRESS NOTES
Post-op TKA Visit      08/16/22     Allergies   Allergen Reactions    Ciprofloxacin Other (See Comments)    Ak-Mycin [Erythromycin] Rash     Current Outpatient Medications on File Prior to Visit   Medication Sig Dispense Refill    acetaminophen (TYLENOL) 500 MG tablet Take 1,000 mg by mouth every 6 hours as needed for Pain      aspirin EC 81 MG EC tablet Take 1 tablet by mouth in the morning and 1 tablet in the evening. 70 tablet 0    promethazine (PHENERGAN) 25 MG tablet Take 1 tablet by mouth every 8 hours as needed for Nausea 30 tablet 1    celecoxib (CELEBREX) 200 MG capsule Take 1 capsule by mouth daily as needed for Pain (Patient taking differently: Take 1 capsule by mouth daily as needed for Pain (moderate pain). ) 60 capsule 2    lisinopril-hydroCHLOROthiazide (PRINZIDE;ZESTORETIC) 10-12.5 MG per tablet Take 1 tablet by mouth in the morning. atorvastatin (LIPITOR) 10 MG tablet Take 10 mg by mouth three times a week M, W, F      Ergocalciferol (VITAMIN D2 PO) Take by mouth 1.25 mg/50,000 units per patient      levothyroxine (SYNTHROID) 75 MCG tablet Take 75 mcg by mouth Daily      ferrous sulfate (SLOW FE) 142 (45 Fe) MG extended release tablet Take 142 mg by mouth daily      calcium carbonate (OSCAL) 500 MG TABS tablet Take 500 mg by mouth daily      vitamin D3 (CHOLECALCIFEROL) 10 MCG (400 UNIT) TABS tablet Take 400 Units by mouth daily      Cholecalciferol (VITAMIN D3) 1.25 MG (02148 UT) TABS Take by mouth once a week      cyclobenzaprine (FLEXERIL) 10 MG tablet Take 10 mg by mouth 2 times daily as needed      [DISCONTINUED] naproxen (NAPROSYN) 500 MG tablet Take 500 mg by mouth 2 times daily as needed        No current facility-administered medications on file prior to visit. 5 week Status Post right TKA     History: The patient returns today for post-op visit following right TKA. Their pain is improving. They are ambulating with no walking aid.  They have completed home physical therapy. Physical Exam:  The patient's incision is well healed. There is moderate swelling and  warmth. ROM is 0 to 105 degrees. There is no M/L or A/P instability. The calf is soft and non-tender. Neurologic and vascular exams are intact. X-Rays: AP and Lateral x-rays of right reveals a hybrid TKA, Danny prosthesis. There is a patella arthroplasty. There is good alignment and good position of the components. X-Ray Diagnosis: Satisfactory appearance right TKA    Disposition: The patient is doing well following right TKA. They will continue physical therapy exercises. The patient was advised about fall precautions and dental prophylaxis. The patient will follow up as scheduled or sooner if needed. Follow up 5 months.

## 2022-08-22 ENCOUNTER — APPOINTMENT (OUTPATIENT)
Dept: PHYSICAL THERAPY | Age: 77
End: 2022-08-22
Payer: MEDICARE

## 2022-08-23 ENCOUNTER — HOSPITAL ENCOUNTER (OUTPATIENT)
Dept: PHYSICAL THERAPY | Age: 77
Setting detail: RECURRING SERIES
Discharge: HOME | End: 2022-08-26
Payer: MEDICARE

## 2022-08-23 PROCEDURE — 97116 GAIT TRAINING THERAPY: CPT

## 2022-08-23 PROCEDURE — 97530 THERAPEUTIC ACTIVITIES: CPT

## 2022-08-23 PROCEDURE — 97110 THERAPEUTIC EXERCISES: CPT

## 2022-08-23 NOTE — PROGRESS NOTES
Alka Brian  : 1945  Primary: [facility]  Secondary:  [address] [address]  Phone: [phone]  Fax: [phone] Plan Frequency: up to 3 x's per week until goals met, discharge or reassessment  Plan of Care/Certification Expiration Date: 22      PT Visit Info: Total # of Visits Approved: 24  Total # of Visits to Date: 2  PT Insurance Information: [employer] HMO  Progress Note Counter: 2     Visit Count:  Visit count could not be calculated. Make sure you are using a visit which is associated with an episode. OUTPATIENT PHYSICAL THERAPY:OP NOTE TYPE: Treatment Note 2022       Episode  }Appt Desk             Treatment Diagnosis:  Pain in Right Knee (M25.561)  Stiffness of Right Knee, Not elsewhere classified (V34.551)  Medical/Referring Diagnosis:  Presence of right artificial knee joint [I74.539]  Referring Physician:  Sumi Lemus MD MD Orders:  PT Eval and Treat   Date of Onset:  Onset Date: 22     Allergies:   Ciprofloxacin and Ak-mycin [erythromycin]  Restrictions/Precautions:  Restrictions/Precautions: Weight Bearing; Fall Risk  Right Lower Extremity Weight Bearing: Weight Bearing As Tolerated     Interventions Planned (Treatment may consist of any combination of the following):    Current Treatment Recommendations: Strengthening; ROM; Balance training; Functional mobility training; Transfer training; ADL/Self-care training; Therapeutic activities; Positioning; Modalities; Home exercise program; Safety education & training     Subjective Comments:pt states she drove and for about a week. Some pain in L knee due to , not walking like old woman, straighter stance, trying to slow down, she thinks 2.0mph is slow.       Initial:}     2/10Post Session:        1/10  Medications Last Reviewed:  2022  Updated Objective Findings:  See evaluation note from 8/15/22  Treatment   Eval only today    Date:  8-23-22 Date:   Date:     Activity/Exercise Parameters Parameters Parameters   Nu step 10min     Sit-stand X6-technique     Calf stretch [id number]     Hip abd,ext, flex X12 ea     gait Fwd, abd,bwd                   Access Code: [email or web address]  URL: [email or web address]  Date: 08/15/2022  Prepared by: Josesito Gregory    Exercises  Long Sitting Quad Set with Towel Roll Under Heel - 2 x daily - 7 x weekly - 1 sets - 1 reps - up to 15 minutes hold  Supine Knee to Chest with Leg Straight - 2 x daily - 7 x weekly - 1 sets - 1 reps - 15 minutes hold  Standing Quad Set - 3 x daily - 5 x weekly - 3 sets - 10 reps - 5 hold  Standing Gluteal Sets - 3 x daily - 5 x weekly - 3 sets - 10 reps - 5 hold  Standing Hip Flexion AROM - 1 x daily - 5-7 x weekly - 1-3 sets - 10 reps - 3 seconds hold  Standing Hip Abduction - 1 x daily - 5-7 x weekly - 1-3 sets - 10 reps - 3 seconds hold  Standing Hip Extension - 1 x daily - 5-7 x weekly - 1-3 sets - 10 reps - 3 seconds hold  Sit to Stand - 1 x daily - 5 x weekly - 1 sets - 3 reps - up to 30 seconds hold  Walking March - 3 x daily - 4 reps  Sideways Walking - 3 x daily - 4 reps  Backwards Walking - 3 x daily - 4 reps    Treatment/Session Summary:    Treatment Assessment: pt reports much more confidence in bilat Les after session. Fatigued with exer. Communication/Consultation:   HEP placed in chart  Equipment provided today:  regarding condition    Recommendations/Intent for next treatment session: Next visit will focus on s/p right knee TKA. .    Total Treatment Billable Duration:  0 minutes  Time In: 1430  Time Out: 1530    Gregg Gomez PT       Charge Capture  }Post Session Pain  PT Visit Info  [employer] [facility] MORENO Guidelines  Scanned Media  Benefits  MyChart    Future Appointments   Date Time Provider Carla Monk   8/25/2022  2:30 PM Opla Farrell1 [facility]   8/30/2022  3:15 PM Gregg Gomez PT [facility]   1/17/2023  9:15 AM José Naranjo Fab Burroughs., MD [facility]

## 2022-08-25 ENCOUNTER — HOSPITAL ENCOUNTER (OUTPATIENT)
Dept: PHYSICAL THERAPY | Age: 77
Setting detail: RECURRING SERIES
Discharge: HOME | End: 2022-08-28
Payer: MEDICARE

## 2022-08-25 PROCEDURE — 97110 THERAPEUTIC EXERCISES: CPT

## 2022-08-25 NOTE — PROGRESS NOTES
Andrew Vera  : 1945  Primary: [facility]  Secondary:  [address] [address]  Phone: [phone]  Fax: [phone] Plan Frequency: up to 3 x's per week until goals met, discharge or reassessment  Plan of Care/Certification Expiration Date: 22      PT Visit Info: Total # of Visits Approved: 24  Total # of Visits to Date: 2  PT Insurance Information: [employer] HMO  Progress Note Counter: 2     Visit Count:  Visit count could not be calculated. Make sure you are using a visit which is associated with an episode. OUTPATIENT PHYSICAL THERAPY:OP NOTE TYPE: Treatment Note 2022       Episode  }Appt Desk             Treatment Diagnosis:  Pain in Right Knee (M25.561)  Stiffness of Right Knee, Not elsewhere classified (G71.263)  Medical/Referring Diagnosis:  Presence of right artificial knee joint [X86.450]  Referring Physician:  Colin Brush MD MD Orders:  PT Eval and Treat   Date of Onset:  Onset Date: 22     Allergies:   Ciprofloxacin and Ak-mycin [erythromycin]  Restrictions/Precautions:  Restrictions/Precautions: Weight Bearing; Fall Risk  Right Lower Extremity Weight Bearing: Weight Bearing As Tolerated     Interventions Planned (Treatment may consist of any combination of the following):    Current Treatment Recommendations: Strengthening; ROM; Balance training; Functional mobility training; Transfer training; ADL/Self-care training; Therapeutic activities; Positioning; Modalities; Home exercise program; Safety education & training     Subjective Comments:pt states she walked around ball game yesterday. Lots of walking, family members remarked that she is walking straighter.      Initial:}     2/10Post Session:        1/10  Medications Last Reviewed:  2022  Updated Objective Findings:  See evaluation note from 8/15/22  Treatment      Date:  22 Date:  25 Date: Activity/Exercise Parameters Parameters Parameters   Nu step 10min x10min    Sit-stand X6-technique     Calf stretch [id number] 2x 30sec    Hip abd,ext, flex X12 ea     gait Fwd, abd,bwd Fwd- thru out, abd,bwd, glut squeeze 1 lap each                THERAPEUTIC EXERCISE: (45 minutes):    Exercises per grid below to improve mobility, strength, balance, and coordination. Required moderate verbal cues to promote proper body alignment, promote proper body posture, and promote proper body mechanics. Progressed range and complexity of movement as indicated. THERAPEUTIC ACTIVITY: ( 0 minutes): Therapeutic activities per grid below to improve mobility, strength, balance, and coordination. Required moderate verbal cues to promote dynamic balance in standing, promote coordination of bilateral, lower extremity(s), and promote motor control of bilateral, lower extremity(s). Access Code: [email or web address]  URL: [email or web address]  Date: 08/15/2022  Prepared by: Josesito Indianola    Exercises  Long Sitting Quad Set with Towel Roll Under Heel - 2 x daily - 7 x weekly - 1 sets - 1 reps - up to 15 minutes hold  Supine Knee to Chest with Leg Straight - 2 x daily - 7 x weekly - 1 sets - 1 reps - 15 minutes hold  Standing Quad Set - 3 x daily - 5 x weekly - 3 sets - 10 reps - 5 hold  Standing Gluteal Sets - 3 x daily - 5 x weekly - 3 sets - 10 reps - 5 hold  Standing Hip Flexion AROM - 1 x daily - 5-7 x weekly - 1-3 sets - 10 reps - 3 seconds hold  Standing Hip Abduction - 1 x daily - 5-7 x weekly - 1-3 sets - 10 reps - 3 seconds hold  Standing Hip Extension - 1 x daily - 5-7 x weekly - 1-3 sets - 10 reps - 3 seconds hold  Sit to Stand - 1 x daily - 5 x weekly - 1 sets - 3 reps - up to 30 seconds hold  Walking March - 3 x daily - 4 reps  Sideways Walking - 3 x daily - 4 reps  Backwards Walking - 3 x daily - 4 reps    Treatment/Session Summary:    Treatment Assessment: emphasis on more fxnl HEP.  Pt still presents glut weakness with standing exer. improved with decreased lurching. Communication/Consultation:   HEP placed in chart  Equipment provided today:  regarding condition    Recommendations/Intent for next treatment session: Next visit will focus on s/p right knee TKA. .    Total Treatment Billable Duration:  0 minutes       Amanda Abreu, PT       Charge Capture  }Post Session Pain  PT Visit Info  [employer] Portal  MD Guidelines  Scanned Media  Benefits  MyChart    Future Appointments   Date Time Provider [facility]   8/30/2022  3:15 PM Amanda Abreu [facility]   1/17/2023  9:15 AM Mary Cotto MD [facility]

## 2022-08-30 ENCOUNTER — HOSPITAL ENCOUNTER (OUTPATIENT)
Dept: PHYSICAL THERAPY | Age: 77
Setting detail: RECURRING SERIES
Discharge: HOME | End: 2022-09-02
Payer: MEDICARE

## 2022-08-30 PROCEDURE — 97530 THERAPEUTIC ACTIVITIES: CPT

## 2022-08-30 PROCEDURE — 97110 THERAPEUTIC EXERCISES: CPT

## 2022-08-30 NOTE — PROGRESS NOTES
Thuan Dawson  : 1945  Primary: [facility]  Secondary:  [address] [address]  Phone: [phone]  Fax: [phone] Plan Frequency: up to 3 x's per week until goals met, discharge or reassessment  Plan of Care/Certification Expiration Date: 22      PT Visit Info: Total # of Visits Approved: 24  Total # of Visits to Date: 3  PT Insurance Information: [employer] HMO  Progress Note Counter: 3     Visit Count:  Visit count could not be calculated. Make sure you are using a visit which is associated with an episode. OUTPATIENT PHYSICAL THERAPY:OP NOTE TYPE: Treatment Note 2022       Episode  }Appt Desk             Treatment Diagnosis:  Pain in Right Knee (M25.561)  Stiffness of Right Knee, Not elsewhere classified (M00.606)  Medical/Referring Diagnosis:  Presence of right artificial knee joint [I34.683]  Referring Physician:  Georgina Mendenhall MD MD Orders:  PT Eval and Treat   Date of Onset:  Onset Date: 22     Allergies:   Ciprofloxacin and Ak-mycin [erythromycin]  Restrictions/Precautions:  Restrictions/Precautions: Weight Bearing; Fall Risk  Right Lower Extremity Weight Bearing: Weight Bearing As Tolerated     Interventions Planned (Treatment may consist of any combination of the following):    Current Treatment Recommendations: Strengthening; ROM; Balance training; Functional mobility training; Transfer training; ADL/Self-care training; Therapeutic activities; Positioning; Modalities; Home exercise program; Safety education & training     Subjective Comments:pt states she has increased pain for unknown reason. Noticed pain when waking improves condition.      Initial:}     6/10Post Session:        2/10  Medications Last Reviewed:  2022  Updated Objective Findings:  See evaluation note from 8/15/22  Treatment      Date:  22 Date:  25 Date:  22   Activity/Exercise Parameters Parameters Parameters   Nu step 10min x10min x10min   Sit-stand X6-technique  X3-technique   Calf stretch [id number] 2x 30sec 2x 30sec   Hip abd,ext, flex X12 ea     gait Fwd, abd,bwd Fwd- thru out, abd,bwd, glut squeeze 1 lap each Fwd- thru out, abd,bwd, glut squeeze 1 lap each               THERAPEUTIC EXERCISE: (35 minutes):    Exercises per grid below to improve mobility, strength, balance, and coordination. Required moderate verbal cues to promote proper body alignment, promote proper body posture, and promote proper body mechanics. Progressed range and complexity of movement as indicated. THERAPEUTIC ACTIVITY: ( 10 minutes): Therapeutic activities per grid below to improve mobility, strength, balance, and coordination. Required moderate verbal cues to promote dynamic balance in standing, promote coordination of bilateral, lower extremity(s), and promote motor control of bilateral, lower extremity(s). Access Code: [email or web address]  URL: [email or web address]  Date: 08/15/2022  Prepared by:  Josesito Jenkins    Exercises  Long Sitting Quad Set with Towel Roll Under Heel - 2 x daily - 7 x weekly - 1 sets - 1 reps - up to 15 minutes hold  Supine Knee to Chest with Leg Straight - 2 x daily - 7 x weekly - 1 sets - 1 reps - 15 minutes hold  Standing Quad Set - 3 x daily - 5 x weekly - 3 sets - 10 reps - 5 hold  Standing Gluteal Sets - 3 x daily - 5 x weekly - 3 sets - 10 reps - 5 hold  Standing Hip Flexion AROM - 1 x daily - 5-7 x weekly - 1-3 sets - 10 reps - 3 seconds hold  Standing Hip Abduction - 1 x daily - 5-7 x weekly - 1-3 sets - 10 reps - 3 seconds hold  Standing Hip Extension - 1 x daily - 5-7 x weekly - 1-3 sets - 10 reps - 3 seconds hold  Sit to Stand - 1 x daily - 5 x weekly - 1 sets - 3 reps - up to 30 seconds hold  Walking March - 3 x daily - 4 reps  Sideways Walking - 3 x daily - 4 reps  Backwards Walking - 3 x daily - 4 reps    Treatment/Session Summary:    Treatment Assessment: difficulty with sitting/standing using gluts, c/o edema and realized motion decreases that 'tightness'. Communication/Consultation:   HEP placed in chart  Equipment provided today:  regarding condition    Recommendations/Intent for next treatment session: Next visit will focus on s/p right knee TKA. .    Total Treatment Billable Duration:  0 minutes  Time In: 5715  Time Out: 1600    Felicia Arora, PT       Charge Capture  }Post Session Pain  PT Visit Info  [employer] Portal  MD Guidelines  Scanned Media  Benefits  MyChart    Future Appointments   Date Time Provider [facility]   1/17/2023  9:15 AM Vic Javier MD [facility]

## 2022-11-10 NOTE — THERAPY DISCHARGE
Shan Hunter  : 1945  Primary: [facility]  Secondary:  [address] [address]  Phone: [phone]  Fax: [phone] Plan Frequency: up to 3 x's per week until goals met, discharge or reassessment  Plan of Care/Certification Expiration Date: 22      PT Visit Info: Total # of Visits Approved: 24  Total # of Visits to Date: 3  PT Insurance Information: [employer] Plus HMO  Progress Note Counter: 3      Visit Count:  Visit count could not be calculated. Make sure you are using a visit which is associated with an episode. OUTPATIENT PHYSICAL THERAPY:OP NOTE TYPE: Discharge Summary 2022               Episode  Appt Desk         Treatment Diagnosis:  Pain in Right Hip (M25.551)  Stiffness of Right Hip, Not elsewhere classified (M25.651)  Medical/Referring Diagnosis:  Presence of right artificial knee joint [C69.163]  Referring Physician:  Quan Fine MD MD Orders:  PT Eval and Treat   Return MD Appt:    Future Appointments   Date Time Provider Carla Monk   2023  9:15 AM Angella Timmons MD [facility]       Date of Onset:  Onset Date: 22     Allergies:  Ciprofloxacin and Ak-mycin [erythromycin]  Restrictions/Precautions:    Restrictions/Precautions: Weight Bearing; Fall Risk  Right Lower Extremity Weight Bearing: Weight Bearing As Tolerated     Medications Last Reviewed:  2022     SUBJECTIVE   History of Injury/Illness (Reason for Referral):  Shan Hunter has been seen in physical therapy from 22 to 22 for   3 attended visits (  no shows,   1 same-day cancellations). Treatment has been discontinued at this time due to patient failing to return for additional treatment. The below goals were not reassessed secondary to pt failing to attend additional sessions prior to discharge.   Thank you for this referral.         PER DR. Walter Garnica 22   Pre-operative Diagnosis:  Arthritis of knee, right [M17.11]  Post-operative Diagnosis: Arthritis of knee, right [M17.11]  Location: [address] [place] [address]  Surgeon: Sangita Meyer MD  Assistant: Radha Don     Anesthesia: General and ACB     Indications: Patient has end stage arthritis. They have tried and failed conservative management. Procedure:Procedure(s) (LRB):  RIGHT KNEE TOTAL ARTHROPLASTY ROBOTIC   (Right)            CPT- 97809- Total knee arthroplasty           23984- Other procedures on musculoskeletal system           0055T- Computer assisted surgical navigation  Patient Stated Goal(s):  \"I am feeling great. \"  Initial:      /10 Post Session:      /10  Past Medical History/Comorbidities:   Ms. Urvashi Rangel  has a past medical history of Arthritis, Hypertension, and Kidney stone. Ms. Urvashi Rangel  has a past surgical history that includes Esophagogastroduodenoscopy; joint replacement (Left, 06/05/2012); and Total knee arthroplasty (Right, 7/22/2022). PLAN        Goals: (Goals have been discussed and agreed upon with patient.)  DISCHARGE GOALS: Time Frame: 12 weeks                                                                                                                                       1.  Pt will be compliant and independent with advanced HEP in order to increase post TKA mobility and strength of the right LE. 2. Pt will increase score on the LEFS to 40/50 in order to demonstrate improved functional mobility and quality of life. 3. Pt will report standing for > 60 minutes with minimal to no increase in pain in order to be able to stand for prolonged periods as needed to perform standing for job. 4. Patient to demonstrate increased strength in bilateral LE's 1/2 muscle grade.                  Post Session Pain  Charge Capture  PT Visit Info MD Guidelines  Justyn

## 2023-01-17 ENCOUNTER — OFFICE VISIT (OUTPATIENT)
Dept: ORTHOPEDIC SURGERY | Age: 78
End: 2023-01-17

## 2023-01-17 DIAGNOSIS — Z96.651 STATUS POST TOTAL KNEE REPLACEMENT, RIGHT: Primary | ICD-10-CM

## 2023-01-17 NOTE — PROGRESS NOTES
Post-op TKA Visit      01/17/23     Allergies   Allergen Reactions    Ciprofloxacin Other (See Comments)    Ak-Mycin [Erythromycin] Rash     Current Outpatient Medications on File Prior to Visit   Medication Sig Dispense Refill    acetaminophen (TYLENOL) 500 MG tablet Take 1,000 mg by mouth every 6 hours as needed for Pain      aspirin EC 81 MG EC tablet Take 1 tablet by mouth in the morning and 1 tablet in the evening. 70 tablet 0    promethazine (PHENERGAN) 25 MG tablet Take 1 tablet by mouth every 8 hours as needed for Nausea 30 tablet 1    celecoxib (CELEBREX) 200 MG capsule Take 1 capsule by mouth daily as needed for Pain (Patient taking differently: Take 1 capsule by mouth daily as needed for Pain (moderate pain). ) 60 capsule 2    lisinopril-hydroCHLOROthiazide (PRINZIDE;ZESTORETIC) 10-12.5 MG per tablet Take 1 tablet by mouth in the morning. atorvastatin (LIPITOR) 10 MG tablet Take 10 mg by mouth three times a week M, W, F      Ergocalciferol (VITAMIN D2 PO) Take by mouth 1.25 mg/50,000 units per patient      levothyroxine (SYNTHROID) 75 MCG tablet Take 75 mcg by mouth Daily      ferrous sulfate (SLOW FE) 142 (45 Fe) MG extended release tablet Take 142 mg by mouth daily      calcium carbonate (OSCAL) 500 MG TABS tablet Take 500 mg by mouth daily      vitamin D3 (CHOLECALCIFEROL) 10 MCG (400 UNIT) TABS tablet Take 400 Units by mouth daily      Cholecalciferol (VITAMIN D3) 1.25 MG (49616 UT) TABS Take by mouth once a week      cyclobenzaprine (FLEXERIL) 10 MG tablet Take 10 mg by mouth 2 times daily as needed      [DISCONTINUED] naproxen (NAPROSYN) 500 MG tablet Take 500 mg by mouth 2 times daily as needed        No current facility-administered medications on file prior to visit. 6 month Status Post right TKA     History: The patient returns today for post-op visit following right TKA. Their pain is improving. They are ambulating with no walking aid.  They have completed home physical therapy. Physical Exam:  The patient's incision is well healed. There is no swelling and  warmth. ROM is 0 to 120 degrees. There is no M/L or A/P instability. The calf is soft and non-tender. Neurologic and vascular exams are intact. X-Rays: AP and Lateral x-rays of right reveals a hybrid TKA, Danny prosthesis. There is a patella arthroplasty. There is good alignment and good position of the components. X-Ray Diagnosis: Satisfactory appearance right TKA    Disposition: The patient is doing well following right TKA. They will continue physical therapy exercises. The patient was advised about fall precautions and dental prophylaxis. The patient will follow up as scheduled or sooner if needed. Follow up 4 years.

## (undated) DEVICE — GUIDEPIN ORTHOPEDIC NAVIGATION 4X140 MM 2P SCREW STRL

## (undated) DEVICE — BLADE RMR L41MM PAT PILOT H

## (undated) DEVICE — SUTURE ABS ANTIBACT 1-0 CTX 24IN STRATAFIX PDS+ SXPP1A445

## (undated) DEVICE — SUTURE ETHBND EXCEL SZ 2 L30IN NONABSORBABLE GRN L75MM LR X496T

## (undated) DEVICE — SUTURE VCRL SZ 2-0 L18IN ABSRB VLT L26MM SH 1/2 CIR J775D

## (undated) DEVICE — SYRINGE MED 10ML LUERLOCK TIP W/O SFTY DISP

## (undated) DEVICE — FLEXIBLE YANKAUER,MEDIUM TIP, NO VACUUM CONTROL: Brand: ARGYLE

## (undated) DEVICE — STERILE SLEEVE: Brand: CONVERTORS

## (undated) DEVICE — SUTURE ABSRB X-1 REV CUT 1/2 CIR 22MM UD BRAID 27IN SZ 3-0 J458H

## (undated) DEVICE — SOLUTION IRRIG 3000ML 0.9% SOD CHL USP UROMATIC PLAS CONT

## (undated) DEVICE — TOTAL KNEE DR JENNINGS: Brand: MEDLINE INDUSTRIES, INC.

## (undated) DEVICE — 450 ML BOTTLE OF 0.05% CHLORHEXIDINE GLUCONATE IN 99.95% STERILE WATER FOR IRRIGATION, USP AND APPLICATOR.: Brand: IRRISEPT ANTIMICROBIAL WOUND LAVAGE

## (undated) DEVICE — BIPOLAR SEALER 23-112-1 AQM 6.0: Brand: AQUAMANTYS ®

## (undated) DEVICE — SUTURE VCRL SZ 2-0 L27IN ABSRB UD L36MM CP-1 1/2 CIR REV J266H

## (undated) DEVICE — SPONGE LAPAROTOMY W18XL18IN WHITE STRUNG RADIOPAQUE STERILE

## (undated) DEVICE — KIT INT FIX FEM TIB CKPT MAKOPLASTY

## (undated) DEVICE — OSCILLATING TIP SAW CARTRIDGE: Brand: STRYKER PRECISION

## (undated) DEVICE — BLADE SURG SAW STD S STL OSC W/ SERR EDGE DISP

## (undated) DEVICE — CURETTE SURG FOR BNE CEM REM QUIK USE

## (undated) DEVICE — DRESSING HYDROFIBER AQUACEL AG ADVANTAGE 3.5X12 IN

## (undated) DEVICE — SOLUTION IRRIG 1000ML 0.9% SOD CHL USP POUR PLAS BTL